# Patient Record
Sex: MALE | Race: WHITE | NOT HISPANIC OR LATINO | Employment: OTHER | URBAN - METROPOLITAN AREA
[De-identification: names, ages, dates, MRNs, and addresses within clinical notes are randomized per-mention and may not be internally consistent; named-entity substitution may affect disease eponyms.]

---

## 2023-08-14 ENCOUNTER — APPOINTMENT (INPATIENT)
Dept: CT IMAGING | Facility: HOSPITAL | Age: 68
DRG: 177 | End: 2023-08-14
Payer: COMMERCIAL

## 2023-08-14 ENCOUNTER — APPOINTMENT (EMERGENCY)
Dept: NON INVASIVE DIAGNOSTICS | Facility: HOSPITAL | Age: 68
DRG: 177 | End: 2023-08-14
Payer: COMMERCIAL

## 2023-08-14 ENCOUNTER — APPOINTMENT (EMERGENCY)
Dept: CT IMAGING | Facility: HOSPITAL | Age: 68
DRG: 177 | End: 2023-08-14
Payer: COMMERCIAL

## 2023-08-14 ENCOUNTER — HOSPITAL ENCOUNTER (INPATIENT)
Facility: HOSPITAL | Age: 68
LOS: 6 days | Discharge: HOME HEALTH | DRG: 177 | End: 2023-08-20
Attending: EMERGENCY MEDICINE | Admitting: INTERNAL MEDICINE
Payer: COMMERCIAL

## 2023-08-14 ENCOUNTER — APPOINTMENT (EMERGENCY)
Dept: RADIOLOGY | Facility: HOSPITAL | Age: 68
DRG: 177 | End: 2023-08-14
Payer: COMMERCIAL

## 2023-08-14 DIAGNOSIS — D70.9 NEUTROPENIA (HCC): ICD-10-CM

## 2023-08-14 DIAGNOSIS — C79.31 MALIGNANT NEOPLASM METASTATIC TO BRAIN (HCC): ICD-10-CM

## 2023-08-14 DIAGNOSIS — C15.9 ESOPHAGEAL CANCER (HCC): ICD-10-CM

## 2023-08-14 DIAGNOSIS — D64.9 ANEMIA: ICD-10-CM

## 2023-08-14 DIAGNOSIS — J18.9 PNEUMONIA DUE TO INFECTIOUS ORGANISM, UNSPECIFIED LATERALITY, UNSPECIFIED PART OF LUNG: ICD-10-CM

## 2023-08-14 DIAGNOSIS — J84.10 PULMONARY FIBROSIS (HCC): ICD-10-CM

## 2023-08-14 DIAGNOSIS — R06.00 DYSPNEA: Primary | ICD-10-CM

## 2023-08-14 PROBLEM — N40.0 BPH (BENIGN PROSTATIC HYPERPLASIA): Status: ACTIVE | Noted: 2022-11-25

## 2023-08-14 PROBLEM — N18.30 CKD (CHRONIC KIDNEY DISEASE) STAGE 3, GFR 30-59 ML/MIN (HCC): Status: ACTIVE | Noted: 2023-05-15

## 2023-08-14 PROBLEM — R06.09 DOE (DYSPNEA ON EXERTION): Status: ACTIVE | Noted: 2023-08-14

## 2023-08-14 PROBLEM — C80.1 CANCER (HCC): Status: ACTIVE | Noted: 2023-08-14

## 2023-08-14 PROBLEM — C80.1 CANCER (HCC): Status: RESOLVED | Noted: 2023-08-14 | Resolved: 2023-08-14

## 2023-08-14 PROBLEM — I82.409 DVT (DEEP VENOUS THROMBOSIS) (HCC): Status: ACTIVE | Noted: 2023-07-31

## 2023-08-14 LAB
2HR DELTA HS TROPONIN: -1 NG/L
4HR DELTA HS TROPONIN: 1 NG/L
ABO GROUP BLD: NORMAL
ABO GROUP BLD: NORMAL
ALBUMIN SERPL BCP-MCNC: 3.6 G/DL (ref 3.5–5)
ALP SERPL-CCNC: 109 U/L (ref 34–104)
ALT SERPL W P-5'-P-CCNC: 101 U/L (ref 7–52)
ANION GAP SERPL CALCULATED.3IONS-SCNC: 10 MMOL/L
ANISOCYTOSIS BLD QL SMEAR: PRESENT
AST SERPL W P-5'-P-CCNC: 42 U/L (ref 13–39)
ATRIAL RATE: 127 BPM
ATRIAL RATE: 94 BPM
ATRIAL RATE: 98 BPM
BASOPHILS # BLD MANUAL: 0 THOUSAND/UL (ref 0–0.1)
BASOPHILS NFR MAR MANUAL: 0 % (ref 0–1)
BILIRUB SERPL-MCNC: 1.35 MG/DL (ref 0.2–1)
BLD GP AB SCN SERPL QL: NEGATIVE
BNP SERPL-MCNC: 106 PG/ML (ref 0–100)
BUN SERPL-MCNC: 29 MG/DL (ref 5–25)
CALCIUM SERPL-MCNC: 8.7 MG/DL (ref 8.4–10.2)
CARDIAC TROPONIN I PNL SERPL HS: 13 NG/L
CARDIAC TROPONIN I PNL SERPL HS: 14 NG/L
CARDIAC TROPONIN I PNL SERPL HS: 15 NG/L
CHLORIDE SERPL-SCNC: 105 MMOL/L (ref 96–108)
CO2 SERPL-SCNC: 24 MMOL/L (ref 21–32)
CREAT SERPL-MCNC: 1.38 MG/DL (ref 0.6–1.3)
EOSINOPHIL # BLD MANUAL: 0 THOUSAND/UL (ref 0–0.4)
EOSINOPHIL NFR BLD MANUAL: 0 % (ref 0–6)
ERYTHROCYTE [DISTWIDTH] IN BLOOD BY AUTOMATED COUNT: 20.5 % (ref 11.6–15.1)
GFR SERPL CREATININE-BSD FRML MDRD: 51 ML/MIN/1.73SQ M
GLUCOSE SERPL-MCNC: 92 MG/DL (ref 65–140)
HCT VFR BLD AUTO: 22.6 % (ref 36.5–49.3)
HGB BLD-MCNC: 7.6 G/DL (ref 12–17)
LDH SERPL-CCNC: 533 U/L (ref 140–271)
LYMPHOCYTES # BLD AUTO: 0.33 THOUSAND/UL (ref 0.6–4.47)
LYMPHOCYTES # BLD AUTO: 23 % (ref 14–44)
MCH RBC QN AUTO: 37.1 PG (ref 26.8–34.3)
MCHC RBC AUTO-ENTMCNC: 33.6 G/DL (ref 31.4–37.4)
MCV RBC AUTO: 110 FL (ref 82–98)
MONOCYTES # BLD AUTO: 0.03 THOUSAND/UL (ref 0–1.22)
MONOCYTES NFR BLD: 2 % (ref 4–12)
NEUTROPHILS # BLD MANUAL: 1.08 THOUSAND/UL (ref 1.85–7.62)
NEUTS BAND NFR BLD MANUAL: 2 % (ref 0–8)
NEUTS SEG NFR BLD AUTO: 73 % (ref 43–75)
P AXIS: 72 DEGREES
P AXIS: 74 DEGREES
PLATELET # BLD AUTO: 115 THOUSANDS/UL (ref 149–390)
PLATELET BLD QL SMEAR: ABNORMAL
PMV BLD AUTO: 10 FL (ref 8.9–12.7)
POTASSIUM SERPL-SCNC: 4.1 MMOL/L (ref 3.5–5.3)
PR INTERVAL: 126 MS
PR INTERVAL: 128 MS
PROCALCITONIN SERPL-MCNC: 0.45 NG/ML
PROT SERPL-MCNC: 5.4 G/DL (ref 6.4–8.4)
QRS AXIS: 38 DEGREES
QRS AXIS: 42 DEGREES
QRS AXIS: 52 DEGREES
QRSD INTERVAL: 74 MS
QRSD INTERVAL: 76 MS
QRSD INTERVAL: 86 MS
QT INTERVAL: 320 MS
QT INTERVAL: 336 MS
QT INTERVAL: 340 MS
QTC INTERVAL: 420 MS
QTC INTERVAL: 425 MS
QTC INTERVAL: 434 MS
RBC # BLD AUTO: 2.05 MILLION/UL (ref 3.88–5.62)
RH BLD: NEGATIVE
RH BLD: NEGATIVE
SODIUM SERPL-SCNC: 139 MMOL/L (ref 135–147)
SPECIMEN EXPIRATION DATE: NORMAL
T WAVE AXIS: 63 DEGREES
T WAVE AXIS: 66 DEGREES
T WAVE AXIS: 88 DEGREES
VENTRICULAR RATE: 106 BPM
VENTRICULAR RATE: 94 BPM
VENTRICULAR RATE: 98 BPM
WBC # BLD AUTO: 1.44 THOUSAND/UL (ref 4.31–10.16)

## 2023-08-14 PROCEDURE — NC001 PR NO CHARGE: Performed by: EMERGENCY MEDICINE

## 2023-08-14 PROCEDURE — 86920 COMPATIBILITY TEST SPIN: CPT

## 2023-08-14 PROCEDURE — 83615 LACTATE (LD) (LDH) ENZYME: CPT | Performed by: INTERNAL MEDICINE

## 2023-08-14 PROCEDURE — G1004 CDSM NDSC: HCPCS

## 2023-08-14 PROCEDURE — 93010 ELECTROCARDIOGRAM REPORT: CPT | Performed by: STUDENT IN AN ORGANIZED HEALTH CARE EDUCATION/TRAINING PROGRAM

## 2023-08-14 PROCEDURE — 86900 BLOOD TYPING SEROLOGIC ABO: CPT

## 2023-08-14 PROCEDURE — 93010 ELECTROCARDIOGRAM REPORT: CPT | Performed by: INTERNAL MEDICINE

## 2023-08-14 PROCEDURE — 93005 ELECTROCARDIOGRAM TRACING: CPT

## 2023-08-14 PROCEDURE — 86901 BLOOD TYPING SEROLOGIC RH(D): CPT

## 2023-08-14 PROCEDURE — 84484 ASSAY OF TROPONIN QUANT: CPT

## 2023-08-14 PROCEDURE — 83880 ASSAY OF NATRIURETIC PEPTIDE: CPT

## 2023-08-14 PROCEDURE — 36415 COLL VENOUS BLD VENIPUNCTURE: CPT

## 2023-08-14 PROCEDURE — 87449 NOS EACH ORGANISM AG IA: CPT | Performed by: INTERNAL MEDICINE

## 2023-08-14 PROCEDURE — 85027 COMPLETE CBC AUTOMATED: CPT

## 2023-08-14 PROCEDURE — 99223 1ST HOSP IP/OBS HIGH 75: CPT | Performed by: INTERNAL MEDICINE

## 2023-08-14 PROCEDURE — 70491 CT SOFT TISSUE NECK W/DYE: CPT

## 2023-08-14 PROCEDURE — P9040 RBC LEUKOREDUCED IRRADIATED: HCPCS

## 2023-08-14 PROCEDURE — 86850 RBC ANTIBODY SCREEN: CPT

## 2023-08-14 PROCEDURE — 80053 COMPREHEN METABOLIC PANEL: CPT

## 2023-08-14 PROCEDURE — 99285 EMERGENCY DEPT VISIT HI MDM: CPT | Performed by: EMERGENCY MEDICINE

## 2023-08-14 PROCEDURE — 99285 EMERGENCY DEPT VISIT HI MDM: CPT

## 2023-08-14 PROCEDURE — 84145 PROCALCITONIN (PCT): CPT | Performed by: INTERNAL MEDICINE

## 2023-08-14 PROCEDURE — 30233N1 TRANSFUSION OF NONAUTOLOGOUS RED BLOOD CELLS INTO PERIPHERAL VEIN, PERCUTANEOUS APPROACH: ICD-10-PCS | Performed by: EMERGENCY MEDICINE

## 2023-08-14 PROCEDURE — 85007 BL SMEAR W/DIFF WBC COUNT: CPT

## 2023-08-14 PROCEDURE — 71250 CT THORAX DX C-: CPT

## 2023-08-14 PROCEDURE — 87040 BLOOD CULTURE FOR BACTERIA: CPT | Performed by: INTERNAL MEDICINE

## 2023-08-14 PROCEDURE — 93971 EXTREMITY STUDY: CPT

## 2023-08-14 PROCEDURE — 71045 X-RAY EXAM CHEST 1 VIEW: CPT

## 2023-08-14 RX ORDER — LANOLIN ALCOHOL/MO/W.PET/CERES
10 CREAM (GRAM) TOPICAL
Status: DISCONTINUED | OUTPATIENT
Start: 2023-08-14 | End: 2023-08-20 | Stop reason: HOSPADM

## 2023-08-14 RX ORDER — CEFTRIAXONE 1 G/50ML
1000 INJECTION, SOLUTION INTRAVENOUS EVERY 24 HOURS
Status: DISCONTINUED | OUTPATIENT
Start: 2023-08-14 | End: 2023-08-17

## 2023-08-14 RX ORDER — MELATONIN
1000 DAILY
Status: DISCONTINUED | OUTPATIENT
Start: 2023-08-15 | End: 2023-08-20 | Stop reason: HOSPADM

## 2023-08-14 RX ORDER — PANTOPRAZOLE SODIUM 40 MG/1
40 TABLET, DELAYED RELEASE ORAL DAILY
COMMUNITY

## 2023-08-14 RX ORDER — PREDNISONE 10 MG/1
5 TABLET ORAL DAILY
COMMUNITY
Start: 2023-08-10 | End: 2023-08-20

## 2023-08-14 RX ORDER — SENNOSIDES A AND B 8.6 MG/1
8.6 TABLET, FILM COATED ORAL 2 TIMES DAILY PRN
COMMUNITY

## 2023-08-14 RX ORDER — METOPROLOL SUCCINATE 25 MG/1
12.5 TABLET, EXTENDED RELEASE ORAL 2 TIMES DAILY
Status: DISCONTINUED | OUTPATIENT
Start: 2023-08-14 | End: 2023-08-20 | Stop reason: HOSPADM

## 2023-08-14 RX ORDER — SIMETHICONE 80 MG
80 TABLET,CHEWABLE ORAL 4 TIMES DAILY PRN
Status: DISCONTINUED | OUTPATIENT
Start: 2023-08-14 | End: 2023-08-20 | Stop reason: HOSPADM

## 2023-08-14 RX ORDER — ALBUTEROL SULFATE 90 UG/1
2 AEROSOL, METERED RESPIRATORY (INHALATION) EVERY 6 HOURS PRN
Status: DISCONTINUED | OUTPATIENT
Start: 2023-08-14 | End: 2023-08-20 | Stop reason: HOSPADM

## 2023-08-14 RX ORDER — ASPIRIN 325 MG
325 TABLET, DELAYED RELEASE (ENTERIC COATED) ORAL DAILY
COMMUNITY

## 2023-08-14 RX ORDER — METOPROLOL SUCCINATE 25 MG/1
12.5 TABLET, EXTENDED RELEASE ORAL 2 TIMES DAILY
COMMUNITY
Start: 2022-12-05 | End: 2023-12-05

## 2023-08-14 RX ORDER — ONDANSETRON 2 MG/ML
4 INJECTION INTRAMUSCULAR; INTRAVENOUS EVERY 6 HOURS PRN
Status: DISCONTINUED | OUTPATIENT
Start: 2023-08-14 | End: 2023-08-20 | Stop reason: HOSPADM

## 2023-08-14 RX ORDER — LANOLIN ALCOHOL/MO/W.PET/CERES
10 CREAM (GRAM) TOPICAL
COMMUNITY

## 2023-08-14 RX ORDER — MAGNESIUM HYDROXIDE/ALUMINUM HYDROXICE/SIMETHICONE 120; 1200; 1200 MG/30ML; MG/30ML; MG/30ML
30 SUSPENSION ORAL EVERY 6 HOURS PRN
Status: DISCONTINUED | OUTPATIENT
Start: 2023-08-14 | End: 2023-08-20 | Stop reason: HOSPADM

## 2023-08-14 RX ORDER — AZITHROMYCIN 250 MG/1
500 TABLET, FILM COATED ORAL EVERY 24 HOURS
Status: DISCONTINUED | OUTPATIENT
Start: 2023-08-14 | End: 2023-08-17

## 2023-08-14 RX ORDER — ACETAMINOPHEN 325 MG/1
650 TABLET ORAL EVERY 6 HOURS PRN
Status: DISCONTINUED | OUTPATIENT
Start: 2023-08-14 | End: 2023-08-20 | Stop reason: HOSPADM

## 2023-08-14 RX ORDER — PREDNISONE 5 MG/1
5 TABLET ORAL DAILY
Status: DISCONTINUED | OUTPATIENT
Start: 2023-08-15 | End: 2023-08-17

## 2023-08-14 RX ORDER — FINASTERIDE 5 MG/1
5 TABLET, FILM COATED ORAL DAILY
Status: DISCONTINUED | OUTPATIENT
Start: 2023-08-14 | End: 2023-08-20 | Stop reason: HOSPADM

## 2023-08-14 RX ORDER — ALBUTEROL SULFATE 90 UG/1
2 AEROSOL, METERED RESPIRATORY (INHALATION) EVERY 6 HOURS PRN
COMMUNITY
Start: 2023-08-10

## 2023-08-14 RX ORDER — CALCIUM CARBONATE 500 MG/1
1000 TABLET, CHEWABLE ORAL DAILY PRN
Status: DISCONTINUED | OUTPATIENT
Start: 2023-08-14 | End: 2023-08-20 | Stop reason: HOSPADM

## 2023-08-14 RX ORDER — PANTOPRAZOLE SODIUM 40 MG/1
40 TABLET, DELAYED RELEASE ORAL
Status: DISCONTINUED | OUTPATIENT
Start: 2023-08-15 | End: 2023-08-20 | Stop reason: HOSPADM

## 2023-08-14 RX ORDER — FINASTERIDE 5 MG/1
5 TABLET, FILM COATED ORAL DAILY
COMMUNITY

## 2023-08-14 RX ADMIN — FINASTERIDE 5 MG: 5 TABLET, FILM COATED ORAL at 18:28

## 2023-08-14 RX ADMIN — APIXABAN 5 MG: 5 TABLET, FILM COATED ORAL at 18:28

## 2023-08-14 RX ADMIN — AZITHROMYCIN 500 MG: 250 TABLET, FILM COATED ORAL at 18:28

## 2023-08-14 RX ADMIN — Medication 10.5 MG: at 21:41

## 2023-08-14 RX ADMIN — IOHEXOL 85 ML: 350 INJECTION, SOLUTION INTRAVENOUS at 08:42

## 2023-08-14 RX ADMIN — CEFTRIAXONE 1000 MG: 1 INJECTION, SOLUTION INTRAVENOUS at 18:28

## 2023-08-14 RX ADMIN — METOPROLOL SUCCINATE 12.5 MG: 25 TABLET, EXTENDED RELEASE ORAL at 18:28

## 2023-08-14 RX ADMIN — ONDANSETRON 4 MG: 2 INJECTION INTRAMUSCULAR; INTRAVENOUS at 19:36

## 2023-08-14 NOTE — ASSESSMENT & PLAN NOTE
Likely related to patient's chemotherapy, does have pancytopenia  Transfuse for hemoglobin less than 7  Hematology consult placed, will check [facility]

## 2023-08-14 NOTE — H&P
[facility]  H&P  Name: Jaz Gold 77 y.o. male I MRN: [id number]  Unit/Bed#: ED-29 I Date of Admission: 8/14/2023   Date of Service: 8/14/2023 I Hospital Day: 0  Assessment and Plan  * Pneumonia  Assessment & Plan  Possible gram-negative pneumonia given infiltrate seen on chest radiograph  Drip score of 4, patient is immunocompromised  Ceftriaxone and azithromycin, day #1  Obtain sputum culture, Legionella and strep pneumo antigen  Procalcitonin although may be unreliable in the setting of malignancy  Has had previous bronchoscopy in the past without any definitive organism    Cancer (HCC)-resolved as of 8/14/2023  Assessment & Plan          Anemia  Assessment & Plan  Likely related to patient's chemotherapy, does have pancytopenia  Transfuse for hemoglobin less than 7  Hematology consult placed, will check [facility]    DEMARCO (dyspnea on exertion)  Assessment & Plan  History of ongoing dyspnea on exertion, previously evaluated at [facility] with possible pneumonitis  He remains on prednisone, CT of the chest ordered for further characterization of right lower lobe infiltrate  Possibly related to patient's cancer  Low clinical suspicion for pulmonary embolism given recent negative PE study from 3 days ago, patient is fully anticoagulated with Eliquis    Neutropenia ([address])  Assessment & Plan  Continue neutropenic precautions, hematology consultation placed to discuss  Neupogen    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 Follows with [facility] oncology. Has had multiple admissions for dyspnea on exertion with negative PE and ACS work-ups.     DVT (deep venous thrombosis) (HCC)  Assessment & Plan  Remains on treatment with Eliquis    CKD (chronic kidney disease) stage 3, GFR 30-59 ml/min [facility])  Assessment & Plan  Lab Results   Component Value Date    EGFR 51 08/14/2023    CREATININE 1.38 (H) 08/14/2023   Renal function around historical baseline    BPH (benign prostatic hyperplasia)  Assessment & Plan  Resume medication for BPH and monitor urine output      Code Status: Level 3 - DNAR and DNI     VTE Prophylaxis: Apixaban (Eliquis)  / sequential compression device     POLST: There is no POLST form on file for this patient (pre-hospital)  Discussion with family: Patient updating family    Anticipated Length of Stay:  Patient will be admitted on an Inpatient basis with an anticipated length of stay of greater than 2 midnights. Justification for Hospital Stay: Pneumonia , ongoing dyspnea on exertion    Total Time for Visit, including Counseling / Coordination of Care: 1 hour. Greater than 50% of this total time spent on direct patient counseling and coordination of care. Chief Complaint:     Chief Complaint   Patient presents with   • Shortness of Breath     Pt states SOB for over a week, worse with activity. In the past week pt reports throat feeling swollen- last chemo treatment wed. Pt reports dull chest pain. Pt reports being diagnosed with DVT in right leg and put eliquis. Pt left arm is swollen. History of Present Illness:    Yaritza Castellon is a 77 y.o. male who presents with distant breath x1 week. The patient has had previous history of checkpoint inhibitor induced pneumonitis. He also receives chemotherapy. He does have a history of DVT in the right leg and is on currently on Eliquis. Recently had a PE study from 3 days ago which showed no pulmonary embolism. Review of the patient's records shows that he has had ongoing dyspnea on exertion since the end of July. Today he reports having some productive sputum, he is also noted to be neutropenic. Given his previous therapy induced pneumonitis he is on steroid treatment. .  Patient denies any recent travel or trip history    Review of Systems:    A complete and comprehensive 14 point organ system review was performed and all other systems are negative other than stated above in the HPI    Past Medical and Surgical History:     Past Medical History:   Diagnosis Date   • Cancer ([address])     esophageal   • DVT (deep venous thrombosis) ([address])        History reviewed. No pertinent surgical history. Meds/Allergies:    Prior to Admission medications    Medication Sig Start Date End Date Taking? Authorizing Provider   albuterol (PROVENTIL HFA,VENTOLIN HFA) 90 mcg/act inhaler Inhale 2 puffs every 6 (six) hours as needed 8/10/23  Yes Historical Provider, MD   apixaban (ELIQUIS) 5 mg Take 5 mg by mouth 2 (two) times a day 8/9/23 8/8/24 Yes Historical Provider, MD   aspirin (ECOTRIN) 325 mg EC tablet Take 325 mg by mouth in the morning   Yes Historical Provider, MD   Cholecalciferol 50 MCG (2000 UT) CAPS Take 1 capsule by mouth every morning   Yes Historical Provider, MD   finasteride (PROSCAR) 5 mg tablet Take 5 mg by mouth in the morning   Yes Historical Provider, MD   melatonin 3 mg Take 10 mg by mouth daily at bedtime   Yes Historical Provider, MD   metoprolol succinate (TOPROL-XL) 25 mg 24 hr tablet Take 12.5 mg by mouth 2 (two) times a day 12/5/22 12/5/23 Yes Historical Provider, MD   pantoprazole (PROTONIX) 40 mg tablet Take 40 mg by mouth in the morning   Yes Historical Provider, MD   predniSONE 10 mg tablet Take 5 mg by mouth daily 8/10/23  Yes Historical Provider, MD   senna (SENOKOT) 8.6 MG tablet Take 8.6 mg by mouth 2 (two) times a day as needed   Yes Historical Provider, MD   umeclidinium-vilanterol 62.5-25 mcg/actuation inhaler Inhale 1 puff daily 4/7/23  Yes Historical Provider, MD     I have reviewed home medications with patient personally. Allergies: Allergies   Allergen Reactions   • Levofloxacin Nausea Only   • Doxycycline GI Intolerance   • Penicillins GI Intolerance       Social History:     Marital Status:     Occupation: Retired    Substance Use History:   Social History     Substance and Sexual Activity   Alcohol Use Not Currently     Social History     Tobacco Use   Smoking Status Former   • Types: Cigarettes   Smokeless Tobacco Not on file     Social History     Substance and Sexual Activity   Drug Use Never       Family History:    History reviewed. No pertinent family history. Physical Exam:     Vitals:   Blood Pressure: 138/89 (08/14/23 1535)  Pulse: 96 (08/14/23 1535)  Temperature: 98.2 °F (36.8 °C) (08/14/23 1535)  Temp Source: Oral (08/14/23 1504)  Respirations: 18 (08/14/23 1535)  Height: 5' 9" (175.3 cm) (08/14/23 0717)  Weight - Scale: 86.6 kg (191 lb) (08/14/23 0717)  SpO2: 98 % (08/14/23 1535)      General: ill appearing, no acute distress  HEENT: atraumatic, PERRLA, moist mucosa, normal pharynx, normal tonsils and adenoids, normal tongue, no fluid in sinuses  Neck: Trachea midline, no carotid bruit, no masses  Respiratory: normal chest wall expansion, coarse breath sound, no r/r/w, no rubs  Cardiovascular: RRR, no m/r/g, Normal S1 and S2  Abdomen: Soft, non-tender, non-distended, normal bowel sounds in all quadrants, no hepatosplenomegaly, no tympany  Rectal: deferred  Musculoskeletal: normal ROM in upper and lower extremities  Integumentary: warm, dry, and pink, with no rash, purpura, or petechia  Heme/Lymph: no lymphadenopathy, no bruises  Neurological: Cranial Nerves II-XII grossly intact  Psychiatric: cooperative with normal mood, affect, and cognition    Additional Data:     Lab Results: I have personally reviewed pertinent reports.       Results from last 7 days   Lab Units 08/14/23  0748   WBC Thousand/uL 1.44*   HEMOGLOBIN g/dL 7.6*   HEMATOCRIT % 22.6*   PLATELETS Thousands/uL 115*   BANDS PCT % 2   LYMPHO PCT % 23   MONO PCT % 2*   EOS PCT % 0     Results from last 7 days   Lab Units 08/14/23  0748   SODIUM mmol/L 139   POTASSIUM mmol/L 4.1   CHLORIDE mmol/L 105   CO2 mmol/L 24   BUN mg/dL 29*   CREATININE mg/dL 1.38*   ANION GAP mmol/L 10 CALCIUM mg/dL 8.7   ALBUMIN g/dL 3.6   TOTAL BILIRUBIN mg/dL 1.35*   ALK PHOS U/L 109*   ALT U/L 101*   AST U/L 42*   GLUCOSE RANDOM mg/dL 92                     Results from last 7 days   Lab Units 08/14/23  1157 08/14/23  1012 08/14/23  0748   HS TNI 0HR ng/L  --   --  14   HS TNI 2HR ng/L  --  13  --    HS TNI 4HR ng/L 15  --   --        Imaging: I have personally reviewed pertinent reports. CT chest wo contrast   Final Result by Noreen Erwin MD (08/14 1702)      No definite acute disease. Small cluster of tree-in-bud nodules in the lateral right upper lobe, likely minimal bronchiolitis. Mild lower lobe basilar predominant reticulation and traction bronchiolectasis suggestive of mild fibrosis. No visible esophageal malignancy. Workstation performed: [id number]         CT soft tissue neck   Final Result by Bard Rebekah MD (08/14 0169)      No nodular enhancing lesions identified along the course of the aerodigestive tract. No discrete edema or collection identified. Correlate with outside prior exams for findings related to the patient's known clinical history of distal esophageal neoplasm. Workstation performed: [id number]         XR chest 1 view portable   ED Interpretation by Edgardo Boast, DO (08/14 4342)   Wet read - increased congestion RLL no effusion PTX consolidation, chemo port noted      Final Result by Marissa Martel MD (08/14 0908)   Faint groundglass opacity at right lung base suspicious for early infiltrate versus atelectasis         Workstation performed: [id number]         VAS upper limb venous duplex scan, unilateral/limited    (Results Pending)       EKG, Pathology, and Other Studies Reviewed on Admission:   · Chest radiograph reviewed, possible right lower lobe infiltrate    Allscripts / Epic Records Reviewed: Yes     ** Please Note: This note was completed in part utilizing [employer] Direct Software.   Grammatical errors, random word insertions, spelling mistakes, and incomplete sentences may be an occasional consequence of this system secondary to software limitations, ambient noise, and hardware issues. If you have any questions or concerns about the content, text, or information contained within the body of this dictation, please contact the provider for clarification. **

## 2023-08-14 NOTE — ASSESSMENT & PLAN NOTE
History of ongoing dyspnea on exertion, previously evaluated at [facility] with possible pneumonitis  He remains on prednisone, CT of the chest ordered for further characterization of right lower lobe infiltrate  Possibly related to patient's cancer  Low clinical suspicion for pulmonary embolism given recent negative PE study from 3 days ago, patient is fully anticoagulated with Eliquis

## 2023-08-14 NOTE — ED ATTENDING ATTESTATION
8[facility]14/2023  [facility]. I have discussed the patient with the resident/non-physician practitioner and agree with the resident's/non-physician practitioner's findings, Plan of Care, and MDM as documented in the resident's/non-physician practitioner's note, except where noted. All available labs and Radiology studies were reviewed. I was present for key portions of any procedure(s) performed by the resident/non-physician practitioner and I was immediately available to provide assistance. At this point I agree with the current assessment done in the Emergency Department. I have conducted an independent evaluation of this patient a history and physical is as follows:      A 75-year-old male with past medical history of CKD, DVT on Eliquis, TIA, BPH, metastatic esophageal cancer (on chemo), hyperlipidemia and hypertension; presents with shortness of breath that is gotten progressively worse for the past 2 weeks. Dyspnea significantly worsened last evening, when he felt his throat was closing. He did also notice a fullness to the anterior lower neck last evening. He has otherwise denied fever, chills, cough, chest pain, abdominal pain, nausea, vomiting, diarrhea, peripheral edema and rashes. He was seen at [address] [place] [address] ED on 8/11 for the shortness of breath, undergoing lab work and a CT PE study which were within normal limits. Physical Exam  General Appearance: alert and oriented, nad, non toxic appearing  Skin:  Warm, dry, intact  HEENT: atraumatic, normocephalic. Normal voice. No lip/tongue swelling. Neck: Supple, trachea midline. Soft tissue fullness noted to bilateral supraclavicular area. No induration, fluctuance or erythema.   Cardiac: RRR; no murmurs, rub, gallops  Pulmonary: lungs CTAB; no wheezes, rales, rhonchi  Gastrointestinal: abdomen soft, nontender, nondistended; no guarding or rebound tenderness; good bowel sounds, no mass or bruits  Extremities:  no pedal edema, 2+ pulses; no calf tenderness, no clubbing, no cyanosis  Neuro:  no focal motor or sensory deficits, CN 2-12 grossly intact  Psych:  Normal mood and affect, normal judgement and insight    Assessment and Plan:  Dyspnea, worsening over the past few weeks. Associated with fullness to the lower neck that began last evening. Reviewed recent ED work-up at [facility] which included a negative CT PE study. We will check lab work to evaluate for anemia, MARIN, electrolyte normality and cardiac etiology. Will obtain CT soft tissue neck to evaluate for lymphadenopathy & increased tumor burden. ED Course  ED Course as of 08/15/23 0912   Mon Aug 14, 2023   0849 Hemoglobin(!): 7.6  Most recent Hgb at [facility] 9, previous hemoglobin 10-12 over past several months         Critical Care Time  CriticalCare Time    Date/Time: 8/14/2023 12:14 PM    Performed by: Adi Dalton DO  Authorized by: Adi Dalton DO    Critical care provider statement:     Critical care time (minutes):  30    Critical care time was exclusive of:  Separately billable procedures and treating other patients and teaching time    Critical care was necessary to treat or prevent imminent or life-threatening deterioration of the following conditions: symptomatic anemia.     Critical care was time spent personally by me on the following activities:  Obtaining history from patient or surrogate, development of treatment plan with patient or surrogate, examination of patient, evaluation of patient's response to treatment, re-evaluation of patient's condition, ordering and review of radiographic studies, ordering and performing treatments and interventions, review of old charts and ordering and review of laboratory studies    I assumed direction of critical care for this patient from another provider in my specialty: no

## 2023-08-14 NOTE — ED PROVIDER NOTES
History  Chief Complaint   Patient presents with   • Shortness of Breath     Pt states SOB for over a week, worse with activity. In the past week pt reports throat feeling swollen- last chemo treatment wed. Pt reports dull chest pain. Pt reports being diagnosed with DVT in right leg and put eliquis. Pt left arm is swollen. Patient is a 49-year-old male past medical history esophageal cancer on chemotherapy that presents for evaluation of shortness of breath. Patient states that his symptoms have been ongoing but have worsened over the past week. States that he was seen during chemo session x3 days prior to arrival was noted to be short of breath at that time was seen and evaluated at [facility] ED and discharged home however his symptoms have persisted throughout the weekend. Dyspnea worse with exertion. Patient states that starting last night he felt that his throat was "closing up". Also complains of dull substernal nonradiating chest pain yesterday that has resolved. Also endorses right upper extremity swelling which she states is new today. Denies fever chills cough palpitations abdominal pain nausea vomiting diarrhea or any other complaints at this time. Of note patient has right lower extremity DVT, is on Eliquis. Prior to Admission Medications   Prescriptions Last Dose Informant Patient Reported? Taking?    Cholecalciferol 50 MCG (2000 UT) CAPS   Yes Yes   Sig: Take 1 capsule by mouth every morning   albuterol (PROVENTIL HFA,VENTOLIN HFA) 90 mcg/act inhaler   Yes Yes   Sig: Inhale 2 puffs every 6 (six) hours as needed   apixaban (ELIQUIS) 5 mg   Yes Yes   Sig: Take 5 mg by mouth 2 (two) times a day   aspirin (ECOTRIN) 325 mg EC tablet   Yes Yes   Sig: Take 325 mg by mouth in the morning   finasteride (PROSCAR) 5 mg tablet   Yes Yes   Sig: Take 5 mg by mouth in the morning   melatonin 3 mg   Yes Yes   Sig: Take 10 mg by mouth daily at bedtime   metoprolol succinate (TOPROL-XL) 25 mg 24 hr tablet   Yes Yes   Sig: Take 12.5 mg by mouth 2 (two) times a day   pantoprazole (PROTONIX) 40 mg tablet   Yes Yes   Sig: Take 40 mg by mouth in the morning   predniSONE 10 mg tablet   Yes Yes   Sig: Take 5 mg by mouth daily   senna (SENOKOT) 8.6 MG tablet   Yes Yes   Sig: Take 8.6 mg by mouth 2 (two) times a day as needed   umeclidinium-vilanterol 62.5-25 mcg/actuation inhaler   Yes Yes   Sig: Inhale 1 puff daily      Facility-Administered Medications: None       Past Medical History:   Diagnosis Date   • Cancer ([address])     esophageal   • DVT (deep venous thrombosis) ([address])        History reviewed. No pertinent surgical history. History reviewed. No pertinent family history. I have reviewed and agree with the history as documented. E-Cigarette/Vaping     E-Cigarette/Vaping Substances   • Nicotine No    • THC No    • CBD No    • Flavoring No    • Other No    • Unknown No      Social History     Tobacco Use   • Smoking status: Former     Types: Cigarettes   Substance Use Topics   • Alcohol use: Not Currently   • Drug use: Never        Review of Systems   Constitutional: Negative for chills and fever. HENT: Negative for congestion. Respiratory: Positive for shortness of breath. Negative for cough. Cardiovascular: Positive for chest pain. Negative for palpitations. Gastrointestinal: Negative for abdominal pain, nausea and vomiting. Musculoskeletal: Negative for back pain. All other systems reviewed and are negative.       Physical Exam  ED Triage Vitals [08/14/23 0717]   Temperature Pulse Respirations Blood Pressure SpO2   98.6 °F (37 °C) (!) 114 (!) 26 122/83 99 %      Temp Source Heart Rate Source Patient Position - Orthostatic VS BP Location FiO2 (%)   Oral Monitor Lying Right arm --      Pain Score       3             Orthostatic Vital Signs  Vitals:    08/14/23 1338 08/14/23 1445 08/14/23 1504 08/14/23 1535   BP: 122/78 128/93 131/81 138/89   Pulse: 96 100 99 96   Patient Position - Orthostatic VS:  Lying         Physical Exam  Vitals and nursing note reviewed. Constitutional:       General: He is not in acute distress. Appearance: He is well-developed. He is ill-appearing (Chronically). HENT:      Head: Normocephalic and atraumatic. Mouth/Throat:      Mouth: Mucous membranes are moist.   Eyes:      Extraocular Movements: Extraocular movements intact. Neck:      Comments: Some soft tissue swelling to the right anterior neck. No JVD no overlying skin changes  Cardiovascular:      Rate and Rhythm: Normal rate and regular rhythm. Pulmonary:      Effort: Pulmonary effort is normal. No respiratory distress. Breath sounds: Normal breath sounds. Abdominal:      Palpations: Abdomen is soft. Tenderness: There is no abdominal tenderness. Musculoskeletal:         General: Normal range of motion. Cervical back: Normal range of motion and neck supple. Comments: Right upper extremity edema   Skin:     General: Skin is warm and dry. Neurological:      General: No focal deficit present. Mental Status: He is alert and oriented to person, place, and time.          ED Medications  Medications   iohexol (OMNIPAQUE) 350 MG/ML injection (SINGLE-DOSE) 85 mL (85 mL Intravenous Given 8/14/23 0842)       Diagnostic Studies  Results Reviewed     Procedure Component Value Units Date/Time    HS Troponin I 4hr [[id number]]  (Normal) Collected: 08/14/23 1157    Lab Status: Final result Specimen: Blood from Arm, Left Updated: 08/14/23 1227     hs TnI 4hr 15 ng/L      Delta 4hr hsTnI 1 ng/L     HS Troponin I 2hr [[id number]]  (Normal) Collected: 08/14/23 1012    Lab Status: Final result Specimen: Blood from Arm, Left Updated: 08/14/23 1039     hs TnI 2hr 13 ng/L      Delta 2hr hsTnI -1 ng/L     Manual Differential(PHLEBS Do Not Order) [[id number]]  (Abnormal) Collected: 08/14/23 0748    Lab Status: Final result Specimen: Blood from Arm, Left Updated: 08/14/23 0921     Segmented % 73 % Bands % 2 %      Lymphocytes % 23 %      Monocytes % 2 %      Eosinophils, % 0 %      Basophils % 0 %      Absolute Neutrophils 1.08 Thousand/uL      Lymphocytes Absolute 0.33 Thousand/uL      Monocytes Absolute 0.03 Thousand/uL      Eosinophils Absolute 0.00 Thousand/uL      Basophils Absolute 0.00 Thousand/uL      Total Counted --     Platelet Estimate Borderline     Anisocytosis Present    CBC and differential [[id number]]  (Abnormal) Collected: 08/14/23 0748    Lab Status: Final result Specimen: Blood from Arm, Left Updated: 08/14/23 0843     WBC 1.44 Thousand/uL      RBC 2.05 Million/uL      Hemoglobin 7.6 g/dL      Hematocrit 22.6 %       fL      MCH 37.1 pg      MCHC 33.6 g/dL      RDW 20.5 %      MPV 10.0 fL      Platelets 541 Thousands/uL     B-Type Natriuretic Peptide(BNP) [[id number]]  (Abnormal) Collected: 08/14/23 0748    Lab Status: Final result Specimen: Blood from Arm, Left Updated: 08/14/23 0821      pg/mL     HS Troponin 0hr (reflex protocol) [[id number]]  (Normal) Collected: 08/14/23 0748    Lab Status: Final result Specimen: Blood from Arm, Left Updated: 08/14/23 0820     hs TnI 0hr 14 ng/L     Comprehensive metabolic panel [[id number]]  (Abnormal) Collected: 08/14/23 0748    Lab Status: Final result Specimen: Blood from Arm, Left Updated: 08/14/23 2221     Sodium 139 mmol/L      Potassium 4.1 mmol/L      Chloride 105 mmol/L      CO2 24 mmol/L      ANION GAP 10 mmol/L      BUN 29 mg/dL      Creatinine 1.38 mg/dL      Glucose 92 mg/dL      Calcium 8.7 mg/dL      AST 42 U/L       U/L      Alkaline Phosphatase 109 U/L      Total Protein 5.4 g/dL      Albumin 3.6 g/dL      Total Bilirubin 1.35 mg/dL      eGFR 51 ml/min/1.73sq m     Narrative:      [place] guidelines for Chronic Kidney Disease (CKD):   •  Stage 1 with normal or high GFR (GFR > 90 mL/min/1.73 square meters)  •  Stage 2 Mild CKD (GFR = 60-89 mL/min/1.73 square meters)  •  Stage 3A Moderate CKD (GFR = 45-59 mL/min/1.73 square meters)  •  Stage 3B Moderate CKD (GFR = 30-44 mL/min/1.73 square meters)  •  Stage 4 Severe CKD (GFR = 15-29 mL/min/1.73 square meters)  •  Stage 5 End Stage CKD (GFR <15 mL/min/1.73 square meters)  Note: GFR calculation is accurate only with a steady state creatinine                 CT soft tissue neck   Final Result by Radames Doe MD (08/14 6955)      No nodular enhancing lesions identified along the course of the aerodigestive tract. No discrete edema or collection identified. Correlate with outside prior exams for findings related to the patient's known clinical history of distal esophageal neoplasm.       Workstation performed: [id number]         XR chest 1 view portable   ED Interpretation by Romina Gabriel DO (08/14 7377)   Wet read - increased congestion RLL no effusion PTX consolidation, chemo port noted      Final Result by Lion Betancourt MD (08/14 3357)   Faint groundglass opacity at right lung base suspicious for early infiltrate versus atelectasis         Workstation performed: [id number]         VAS upper limb venous duplex scan, unilateral/limited    (Results Pending)   CTA chest pe study    (Results Pending)         Procedures  ECG 12 Lead Documentation Only    Date/Time: 8/14/2023 10:20 AM    Performed by: Romina Gabriel DO  Authorized by: Romina Gabriel DO    Indications / Diagnosis:  SOB  ECG reviewed by me, the ED Provider: yes    Patient location:  ED  Previous ECG:     Previous ECG:  Unavailable  Interpretation:     Interpretation: abnormal    Rate:     ECG rate:  106    ECG rate assessment: tachycardic    Rhythm:     Rhythm: sinus tachycardia    Ectopy:     Ectopy: PVCs    QRS:     QRS axis:  Normal    QRS intervals:  Normal  Conduction:     Conduction: normal    ST segments:     ST segments:  Normal  T waves:     T waves: normal    ECG 12 Lead Documentation Only    Date/Time: 8/14/2023 10:21 AM    Performed by: Romina Gabriel DO  Authorized by: Ishaan Holloway DO    Indications / Diagnosis:  Repeat trop  ECG reviewed by me, the ED Provider: yes    Patient location:  ED  Previous ECG:     Previous ECG:  Compared to current    Similarity:  Changes noted  Interpretation:     Interpretation: abnormal    Rate:     ECG rate:  96    ECG rate assessment: normal    Rhythm:     Rhythm: sinus rhythm    Ectopy:     Ectopy: none    QRS:     QRS axis:  Normal    QRS intervals:  Normal  Conduction:     Conduction: normal    ST segments:     ST segments:  Normal  T waves:     T waves: normal    ECG 12 Lead Documentation Only    Date/Time: 8/14/2023 12:13 PM    Performed by: Ishaan Holloway DO  Authorized by: Ishaan Holloawy DO    Indications / Diagnosis:  Repeat   ECG reviewed by me, the ED Provider: yes    Patient location:  ED  Previous ECG:     Previous ECG:  Compared to current    Similarity:  Changes noted  Interpretation:     Interpretation: abnormal    Rate:     ECG rate:  98    ECG rate assessment: normal    Rhythm:     Rhythm: sinus rhythm    Ectopy:     Ectopy: PVCs    QRS:     QRS axis:  Normal    QRS intervals:  Normal  Conduction:     Conduction: abnormal      Abnormal conduction: non-specific intraventricular conduction delay    ST segments:     ST segments:  Normal  T waves:     T waves: normal            ED Course  ED Course as of 08/14/23 1546   Mon Aug 14, 2023   0819 BUN(!): 29   0819 Creatinine(!): 1.38  Appears to be at baseline per external chart review    0845 Platelet Count(!): 455   0845 Hemoglobin(!): 7.6  Down from 9 - x1 month ago hgb was 10-12 - bleeding? 0845 WBC(!!): 1.44  Pt likely leukopenic 2/2 chemotherapy, these values are c/w labs from [facility]   0952 (-) guaiac stool test. Exam performed in presence of chaperone Erman Habermann, RN   0350 (-) for DVT and SVT   1022 (-) CT   1044 Delta 2hr hsTnI: -1                             SBIRT 20yo+    Flowsheet Row Most Recent Value   Initial Alcohol Screen: US AUDIT-C     1.  How often do you have a drink containing alcohol? 0 Filed at: 08/14/2023 0746   2. How many drinks containing alcohol do you have on a typical day you are drinking? 0 Filed at: 08/14/2023 0746   3a. Male UNDER 65: How often do you have five or more drinks on one occasion? 0 Filed at: 08/14/2023 0746   3b. FEMALE Any Age, or MALE 65+: How often do you have 4 or more drinks on one occassion? 0 Filed at: 08/14/2023 0746   Audit-C Score 0 Filed at: 08/14/2023 6790   KIARA: How many times in the past year have you. .. Used an illegal drug or used a prescription medication for non-medical reasons? Never Filed at: 08/14/2023 0746                Medical Decision Making  Patient is a 49-year-old male presenting for evaluation of shortness of breath    Differential: Increased tumor burden from esophageal cancer versus ACS versus COPD versus CHF versus electrolyte abnormality versus anemia. Patient had PE study x3 days ago is anticoagulated unlikely to be PE. Will assess for DVT of right upper extremity    Plan: CBC CMP troponin BNP EKG chest x-ray CT soft tissue neck Doppler right upper extremity monitor and reassess    CT does not appear to show any new tumor or mass effect in the neck. Airway appears patent patient has not had any stridor trismus change in phonation difficulty handling secretions. Has been satting well on room air    Labs notable for increase of 750 from 500 several weeks ago. Troponin 14. Kidney function at baseline. Patient neutropenic WBC 1.4 likely secondary to chemo. Hemoglobin 7.6 down from 9 a month ago the month prior to that it was 12. No signs of bleeding negative guaiac test (which was performed in presence of chaperone). We will transfuse 1 unit PRBC. This is also likely due to to chemo    Upon reassessment patient does not feel any improvement to his dyspnea after transfusion. Will admit for dyspnea. Patient hemodynamically stable at time of admission.     Amount and/or Complexity of Data Reviewed  Labs: ordered. Decision-making details documented in ED Course. Radiology: ordered and independent interpretation performed. Risk  Prescription drug management. Decision regarding hospitalization. Disposition  Final diagnoses:   Dyspnea   Neutropenia ([address])   Esophageal cancer ([address])   Anemia     Time reflects when diagnosis was documented in both MDM as applicable and the Disposition within this note     Time User Action Codes Description Comment    8/14/2023  3:46 PM Makenzie De Jesus Add [R06.00] Dyspnea     8/14/2023  3:46 PM Makenzie De Jesus Add [D70.9] Neutropenia ([address])     8/14/2023  3:46 PM Makenzie De Jesus Add [C15.9] Esophageal cancer ([address])     8/14/2023  3:46 PM Makenzie De Jesus Add [D64.9] Anemia       ED Disposition     ED Disposition   Admit    Condition   Stable    Date/Time   Mon Aug 14, 2023  3:46 PM    Comment              Follow-up Information    None         Patient's Medications   Discharge Prescriptions    No medications on file     No discharge procedures on file. PDMP Review     None           ED Provider  Attending physically available and evaluated Lilly Pettit. I managed the patient along with the ED Attending.     Electronically Signed by         Makenzie De Jesus DO  08/16/23 2007

## 2023-08-14 NOTE — ASSESSMENT & PLAN NOTE
Lab Results   Component Value Date    EGFR 51 08/14/2023    CREATININE 1.38 (H) 08/14/2023   Renal function around historical baseline

## 2023-08-14 NOTE — ASSESSMENT & PLAN NOTE
History  metastatic adenocarcinoma of the esophagus with recent admission for nivolumab pneumonitis at [facility] at 7/31/2023 . Follows with [facility] oncology. Has had multiple admissions for dyspnea on exertion with negative PE and ACS work-ups.

## 2023-08-14 NOTE — ASSESSMENT & PLAN NOTE
Possible gram-negative pneumonia given infiltrate seen on chest radiograph  Drip score of 4, patient is immunocompromised  Ceftriaxone and azithromycin, day #1  Obtain sputum culture, Legionella and strep pneumo antigen  Procalcitonin although may be unreliable in the setting of malignancy  Has had previous bronchoscopy in the past without any definitive organism

## 2023-08-15 LAB
ABO GROUP BLD BPU: NORMAL
ANION GAP SERPL CALCULATED.3IONS-SCNC: 10 MMOL/L
ANISOCYTOSIS BLD QL SMEAR: PRESENT
BASOPHILS # BLD MANUAL: 0 THOUSAND/UL (ref 0–0.1)
BASOPHILS NFR MAR MANUAL: 0 % (ref 0–1)
BPU ID: NORMAL
BUN SERPL-MCNC: 28 MG/DL (ref 5–25)
CALCIUM SERPL-MCNC: 8.1 MG/DL (ref 8.4–10.2)
CHLORIDE SERPL-SCNC: 106 MMOL/L (ref 96–108)
CO2 SERPL-SCNC: 23 MMOL/L (ref 21–32)
CREAT SERPL-MCNC: 1.28 MG/DL (ref 0.6–1.3)
CROSSMATCH: NORMAL
EOSINOPHIL # BLD MANUAL: 0 THOUSAND/UL (ref 0–0.4)
EOSINOPHIL NFR BLD MANUAL: 0 % (ref 0–6)
ERYTHROCYTE [DISTWIDTH] IN BLOOD BY AUTOMATED COUNT: 23.6 % (ref 11.6–15.1)
GFR SERPL CREATININE-BSD FRML MDRD: 56 ML/MIN/1.73SQ M
GLUCOSE SERPL-MCNC: 77 MG/DL (ref 65–140)
HCT VFR BLD AUTO: 23.2 % (ref 36.5–49.3)
HGB BLD-MCNC: 7.9 G/DL (ref 12–17)
L PNEUMO1 AG UR QL IA.RAPID: NEGATIVE
LYMPHOCYTES # BLD AUTO: 0.49 THOUSAND/UL (ref 0.6–4.47)
LYMPHOCYTES # BLD AUTO: 20 % (ref 14–44)
MACROCYTES BLD QL AUTO: PRESENT
MCH RBC QN AUTO: 35.3 PG (ref 26.8–34.3)
MCHC RBC AUTO-ENTMCNC: 34.1 G/DL (ref 31.4–37.4)
MCV RBC AUTO: 104 FL (ref 82–98)
METAMYELOCYTES NFR BLD MANUAL: 2 % (ref 0–1)
MONOCYTES # BLD AUTO: 0.15 THOUSAND/UL (ref 0–1.22)
MONOCYTES NFR BLD: 6 % (ref 4–12)
MYELOCYTES NFR BLD MANUAL: 2 % (ref 0–1)
NEUTROPHILS # BLD MANUAL: 1.72 THOUSAND/UL (ref 1.85–7.62)
NEUTS BAND NFR BLD MANUAL: 1 % (ref 0–8)
NEUTS SEG NFR BLD AUTO: 69 % (ref 43–75)
PLATELET # BLD AUTO: 101 THOUSANDS/UL (ref 149–390)
PLATELET BLD QL SMEAR: ABNORMAL
PMV BLD AUTO: 10 FL (ref 8.9–12.7)
POTASSIUM SERPL-SCNC: 4 MMOL/L (ref 3.5–5.3)
PROCALCITONIN SERPL-MCNC: 0.5 NG/ML
RBC # BLD AUTO: 2.24 MILLION/UL (ref 3.88–5.62)
S PNEUM AG UR QL: NEGATIVE
SODIUM SERPL-SCNC: 139 MMOL/L (ref 135–147)
UNIT DISPENSE STATUS: NORMAL
UNIT PRODUCT CODE: NORMAL
UNIT PRODUCT VOLUME: 250 ML
UNIT RH: NORMAL
WBC # BLD AUTO: 2.45 THOUSAND/UL (ref 4.31–10.16)

## 2023-08-15 PROCEDURE — 93971 EXTREMITY STUDY: CPT | Performed by: SURGERY

## 2023-08-15 PROCEDURE — 87077 CULTURE AEROBIC IDENTIFY: CPT | Performed by: INTERNAL MEDICINE

## 2023-08-15 PROCEDURE — 80048 BASIC METABOLIC PNL TOTAL CA: CPT | Performed by: INTERNAL MEDICINE

## 2023-08-15 PROCEDURE — 99232 SBSQ HOSP IP/OBS MODERATE 35: CPT | Performed by: PHYSICIAN ASSISTANT

## 2023-08-15 PROCEDURE — 84145 PROCALCITONIN (PCT): CPT | Performed by: INTERNAL MEDICINE

## 2023-08-15 PROCEDURE — 85007 BL SMEAR W/DIFF WBC COUNT: CPT | Performed by: INTERNAL MEDICINE

## 2023-08-15 PROCEDURE — 87070 CULTURE OTHR SPECIMN AEROBIC: CPT | Performed by: INTERNAL MEDICINE

## 2023-08-15 PROCEDURE — 85027 COMPLETE CBC AUTOMATED: CPT | Performed by: INTERNAL MEDICINE

## 2023-08-15 PROCEDURE — 87186 SC STD MICRODIL/AGAR DIL: CPT | Performed by: INTERNAL MEDICINE

## 2023-08-15 PROCEDURE — 97167 OT EVAL HIGH COMPLEX 60 MIN: CPT

## 2023-08-15 PROCEDURE — 99223 1ST HOSP IP/OBS HIGH 75: CPT | Performed by: NURSE PRACTITIONER

## 2023-08-15 PROCEDURE — 87205 SMEAR GRAM STAIN: CPT | Performed by: INTERNAL MEDICINE

## 2023-08-15 RX ORDER — POLYETHYLENE GLYCOL 3350 17 G/17G
17 POWDER, FOR SOLUTION ORAL DAILY PRN
Status: DISCONTINUED | OUTPATIENT
Start: 2023-08-15 | End: 2023-08-15

## 2023-08-15 RX ORDER — POLYETHYLENE GLYCOL 3350 17 G/17G
17 POWDER, FOR SOLUTION ORAL DAILY
Status: DISCONTINUED | OUTPATIENT
Start: 2023-08-15 | End: 2023-08-20 | Stop reason: HOSPADM

## 2023-08-15 RX ORDER — SODIUM CHLORIDE 9 MG/ML
75 INJECTION, SOLUTION INTRAVENOUS CONTINUOUS
Status: DISCONTINUED | OUTPATIENT
Start: 2023-08-15 | End: 2023-08-16

## 2023-08-15 RX ORDER — SENNOSIDES 8.6 MG
1 TABLET ORAL
Status: DISCONTINUED | OUTPATIENT
Start: 2023-08-16 | End: 2023-08-20 | Stop reason: HOSPADM

## 2023-08-15 RX ORDER — SENNOSIDES 8.6 MG
1 TABLET ORAL ONCE
Status: COMPLETED | OUTPATIENT
Start: 2023-08-15 | End: 2023-08-15

## 2023-08-15 RX ADMIN — PANTOPRAZOLE SODIUM 40 MG: 40 TABLET, DELAYED RELEASE ORAL at 06:51

## 2023-08-15 RX ADMIN — SODIUM CHLORIDE 75 ML/HR: 0.9 INJECTION, SOLUTION INTRAVENOUS at 17:11

## 2023-08-15 RX ADMIN — APIXABAN 5 MG: 5 TABLET, FILM COATED ORAL at 08:35

## 2023-08-15 RX ADMIN — CEFTRIAXONE 1000 MG: 1 INJECTION, SOLUTION INTRAVENOUS at 17:15

## 2023-08-15 RX ADMIN — UMECLIDINIUM BROMIDE AND VILANTEROL TRIFENATATE 1 PUFF: 62.5; 25 POWDER RESPIRATORY (INHALATION) at 08:35

## 2023-08-15 RX ADMIN — POLYETHYLENE GLYCOL 3350 17 G: 17 POWDER, FOR SOLUTION ORAL at 15:45

## 2023-08-15 RX ADMIN — APIXABAN 5 MG: 5 TABLET, FILM COATED ORAL at 17:11

## 2023-08-15 RX ADMIN — FINASTERIDE 5 MG: 5 TABLET, FILM COATED ORAL at 08:35

## 2023-08-15 RX ADMIN — METOPROLOL SUCCINATE 12.5 MG: 25 TABLET, EXTENDED RELEASE ORAL at 08:35

## 2023-08-15 RX ADMIN — SENNOSIDES 8.6 MG: 8.6 TABLET, FILM COATED ORAL at 12:28

## 2023-08-15 RX ADMIN — Medication 1000 UNITS: at 08:35

## 2023-08-15 RX ADMIN — Medication 10.5 MG: at 23:57

## 2023-08-15 RX ADMIN — AZITHROMYCIN 500 MG: 250 TABLET, FILM COATED ORAL at 17:15

## 2023-08-15 RX ADMIN — METOPROLOL SUCCINATE 12.5 MG: 25 TABLET, EXTENDED RELEASE ORAL at 17:11

## 2023-08-15 RX ADMIN — PREDNISONE 5 MG: 5 TABLET ORAL at 08:35

## 2023-08-15 NOTE — ASSESSMENT & PLAN NOTE
History  metastatic adenocarcinoma of the esophagus with recent admission for nivolumab pneumonitis at [facility] at 7/31/2023 . Follows with [facility] oncology. Has had multiple admissions for dyspnea on exertion with negative PE and ACS work-ups. Continue pain management and add MiraLAX and Senokot for constipation. Patient is not interested in discussing hospice at this time and would like to continue current therapy.

## 2023-08-15 NOTE — CONSULTS
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 He is currently being followed by Dr. Dana Real at [facility] oncology. His last treatment was on 8/9/23 (leucovorin, oxaliplatin, fluorouracil [5-FU]). with pump disconnect on Friday, 8/11/23. After pump disconnect, patient was emergently taken to [facility] ED for shortness of breath, but was discharged shortly thereafter. Patient uncertain if he received a growth factor s/p treatment. He presented to the ED on 8/14/23 with dyspnea. He reported having ongoing dyspnea on exertion and was evaluated at [facility] for possible pneumonitis. He did have a history of pneumonitis that was immunotherapy-induced (nivolumab) on 7/31/2023. Imaging showed infiltrate seen on chest x-ray. He was started on ceftriaxone and azithromycin. Sputum culture pending. On examination, patient was resting comfortably with complaints of chest tightness whenever he takes deep breaths, possibly pleuritic chest pain rating it at 6 out of a 10. He also complains of constipation, but has been eating well. He reported consuming all of his breakfast this morning. Patient expressed that he is considering ceasing all chemotherapy treatments in the future as he " is exhausted from multiple hospitalizations."  Discussed general guidelines about comfort directed measures/hospice. He was encouraged to discuss further with his primary oncologist at [facility]. He lives at home with his transgender daughter who has been extremely supportive of his cancer journey. Assessment and Plan:  1. Malignant neoplasm of lower third of esophagus      Malignant neoplasm metastatic to brain     Pancytopenia  2. Neutropenia  -Evident since 7/2023  -WBC 2.45  ANC 1.72. Afebrile.   -s/p chemotherapy on 8/8/23 (oxalipaltin and 5-FU). 5-FU disconnect on 8/11/23. 3. Acute anemia  -Evident since 7/2023. ,  MCHC 34.1   -macrocytic, normochromic  -Hemoglobin trend: 7.9 < 7.6    4. Thrombocytopenia  Platelet trend: 121 < 115  -No signs of bleeding. Presence of diffuse bruising on upper extremities    CBC (8/15/23)  Hemoglobin 7.9  WBC 2.45  ANC 1.72. Afebrile. Platelet 696    PLAN:  -Patient is afebrile with ANC of 1.72. He is currently on antibiotics with no respiratory symptoms with the exception of possible pleuritic chest pain. No recommendations for G-CSF at this time. We will reach out to patient's oncology team at [facility] for additional recommendations.    -Patient expressed his intentions of not continuing with his chemotherapy treatment; briefly discussed comfort-directed measures and possible scenarios if hospice is chosen as an alternative pathway. Encourage patient to discuss with oncologist further. He has declined further conversations about hospice at this time; would like to think about it during this inpatient stay. -Recommend Miralax and Senokot for his constipation, two agents that he has been taking at home    -Recommend as needed pain medication    Outpatient follow up plan: will follow up with Dr. Mrak Zhu at [address] [place] [address]    Thank you for this consult. Jaylon Bansal DNP, [facility]  Hematology-Oncology       History of present illness:  Marc Huang is a 77 y.o. 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 He is currently being followed by Dr. Devendra Morris at [facility] oncology. His last treatment was on 8/9/23 (leucovorin, oxaliplatin, fluorouracil [5-FU]). with pump disconnect on Friday, 8/11/23. After pump disconnect, patient was emergently taken to [facility] ED for shortness of breath, but was discharged shortly thereafter. Patient uncertain if he received a growth factor s/p treatment. He presented to the ED on 8/14/23 with dyspnea.   He reported having ongoing dyspnea on exertion and was evaluated at [facility] for possible pneumonitis. He did have a history of pneumonitis that was immunotherapy-induced (nivolumab) on 7/31/2023. Imaging showed infiltrate seen on chest x-ray. He was started on ceftriaxone and azithromycin. Sputum culture pending. On examination, patient was resting comfortably with complaints of chest tightness whenever he takes deep breaths, possibly pleuritic chest pain rating it at 6 out of a 10. He also complains of constipation, but has been eating well. He reported consuming all of his breakfast this morning. Patient expressed that he is considering ceasing all chemotherapy treatments in the future as he " is exhausted from multiple hospitalizations."  Discussed general guidelines about comfort directed measures/hospice. He was encouraged to discuss further with his primary oncologist at [facility]. Review of Systems:   Review of Systems   Constitutional: Positive for activity change and fatigue. Negative for chills and fever. HENT: Negative for ear pain and sore throat. Eyes: Negative for pain and visual disturbance. Respiratory: Positive for chest tightness. Negative for cough and shortness of breath. Cardiovascular: Negative for chest pain and palpitations. Gastrointestinal: Negative for abdominal pain and vomiting. Genitourinary: Negative for dysuria and hematuria. Musculoskeletal: Negative for arthralgias and back pain. Skin: Negative for color change and rash. Neurological: Negative for seizures and syncope. Psychiatric/Behavioral: The patient is nervous/anxious. All other systems reviewed and are negative. Oncology History:   Cancer Staging   No matching staging information was found for the patient. Oncology History    No history exists. Past Medical History:   Past Medical History:   Diagnosis Date   • Cancer ([address])     esophageal   • DVT (deep venous thrombosis) ([address])        History reviewed.  No pertinent surgical history. History reviewed. No pertinent family history. Social History     Socioeconomic History   • Marital status:       Spouse name: None   • Number of children: None   • Years of education: None   • Highest education level: None   Occupational History   • None   Tobacco Use   • Smoking status: Former     Types: Cigarettes   • Smokeless tobacco: None   Vaping Use   • Vaping Use: None   Substance and Sexual Activity   • Alcohol use: Not Currently   • Drug use: Never   • Sexual activity: Not Currently   Other Topics Concern   • None   Social History Narrative   • None     Social Determinants of Health     Financial Resource Strain: Not on file   Food Insecurity: Not on file   Transportation Needs: Not on file   Physical Activity: Not on file   Stress: Not on file   Social Connections: Not on file   Intimate Partner Violence: Not on file   Housing Stability: Not on file         Current Facility-Administered Medications:   •  acetaminophen (TYLENOL) tablet 650 mg, 650 mg, Oral, Q6H PRN, Williams Gammons, DO  •  albuterol (PROVENTIL HFA,VENTOLIN HFA) inhaler 2 puff, 2 puff, Inhalation, Q6H PRN, Williams Gammons, DO  •  aluminum-magnesium hydroxide-simethicone (MAALOX) oral suspension 30 mL, 30 mL, Oral, Q6H PRN, Williams Gammons, DO  •  apixaban (ELIQUIS) tablet 5 mg, 5 mg, Oral, BID, Colton Burnard May, DO, 5 mg at 08/15/23 7556  •  azithromycin (ZITHROMAX) tablet 500 mg, 500 mg, Oral, Q24H, Colton Burnard May, DO, 500 mg at 08/14/23 1828  •  calcium carbonate (TUMS) chewable tablet 1,000 mg, 1,000 mg, Oral, Daily PRN, Williams Gammons, DO  •  cefTRIAXone (ROCEPHIN) IVPB (premix in dextrose) 1,000 mg 50 mL, 1,000 mg, Intravenous, Q24H, Colton Burnard May, DO, Last Rate: 100 mL/hr at 08/14/23 1828, 1,000 mg at 08/14/23 1828  •  cholecalciferol (VITAMIN D3) tablet 1,000 Units, 1,000 Units, Oral, Daily, Tobe Gammons, DO, 1,000 Units at 08/15/23 0835  •  finasteride (PROSCAR) tablet 5 mg, 5 mg, Oral, Daily, Coltonruth ann Saule Evan, DO, 5 mg at 08/15/23 7021  •  melatonin tablet 10.5 mg, 10.5 mg, Oral, HS, Colton Denyce Evan, DO, 10.5 mg at 08/14/23 2141  •  metoprolol succinate (TOPROL-XL) 24 hr tablet 12.5 mg, 12.5 mg, Oral, BID, Sandra Lopez, DO, 12.5 mg at 08/15/23 5978  •  ondansetron [facility]) injection 4 mg, 4 mg, Intravenous, Q6H PRN, Sandra John, DO, 4 mg at 08/14/23 1936  •  pantoprazole (PROTONIX) EC tablet 40 mg, 40 mg, Oral, Daily Before Breakfast, Colton Greenberg Evan, DO, 40 mg at 08/15/23 4617  •  predniSONE tablet 5 mg, 5 mg, Oral, Daily, Colton Gordon, DO, 5 mg at 08/15/23 6138  •  simethicone (MYLICON) chewable tablet 80 mg, 80 mg, Oral, 4x Daily PRN, Sandra John, DO  •  umeclidinium-vilanterol 62.5-25 mcg/actuation inhaler 1 puff, 1 puff, Inhalation, Daily, Colton Gordon, DO, 1 puff at 08/15/23 0835    Medications Prior to Admission   Medication   • albuterol (PROVENTIL HFA,VENTOLIN HFA) 90 mcg/act inhaler   • apixaban (ELIQUIS) 5 mg   • aspirin (ECOTRIN) 325 mg EC tablet   • Cholecalciferol 50 MCG (2000 UT) CAPS   • finasteride (PROSCAR) 5 mg tablet   • melatonin 3 mg   • metoprolol succinate (TOPROL-XL) 25 mg 24 hr tablet   • pantoprazole (PROTONIX) 40 mg tablet   • predniSONE 10 mg tablet   • senna (SENOKOT) 8.6 MG tablet   • umeclidinium-vilanterol 62.5-25 mcg/actuation inhaler       Allergies   Allergen Reactions   • Levofloxacin Nausea Only   • Doxycycline GI Intolerance   • Penicillins GI Intolerance         Physical Exam:     /93 (BP Location: Right arm)   Pulse 101   Temp (!) 97.3 °F (36.3 °C) (Temporal)   Resp 20   Ht 5' 9" (1.753 m)   Wt 85.2 kg (187 lb 12.8 oz)   SpO2 99%   BMI 27.73 kg/m²     Physical Exam  HENT:      Head: Normocephalic. Mouth/Throat:      Mouth: Mucous membranes are moist.   Eyes:      Conjunctiva/sclera: Conjunctivae normal.   Cardiovascular:      Rate and Rhythm: Normal rate and regular rhythm.    Pulmonary:      Effort: Pulmonary effort is normal.      Comments: Chest pain upon taking deep breaths  Abdominal:      Palpations: Abdomen is soft. Skin:     General: Skin is warm and dry. Findings: Bruising present. Neurological:      General: No focal deficit present. Mental Status: He is alert and oriented to person, place, and time. Motor: Weakness present. Psychiatric:         Behavior: Behavior normal.         Thought Content:  Thought content normal.      Comments: tearful           Recent Results (from the past 48 hour(s))   ECG 12 lead    Collection Time: 08/14/23  7:19 AM   Result Value Ref Range    Ventricular Rate 106 BPM    Atrial Rate 127 BPM    GA Interval  ms    QRSD Interval 74 ms    QT Interval 320 ms    QTC Interval 425 ms    P Axis  degrees    QRS Axis 52 degrees    T Wave Axis 66 degrees   Comprehensive metabolic panel    Collection Time: 08/14/23  7:48 AM   Result Value Ref Range    Sodium 139 135 - 147 mmol/L    Potassium 4.1 3.5 - 5.3 mmol/L    Chloride 105 96 - 108 mmol/L    CO2 24 21 - 32 mmol/L    ANION GAP 10 mmol/L    BUN 29 (H) 5 - 25 mg/dL    Creatinine 1.38 (H) 0.60 - 1.30 mg/dL    Glucose 92 65 - 140 mg/dL    Calcium 8.7 8.4 - 10.2 mg/dL    AST 42 (H) 13 - 39 U/L     (H) 7 - 52 U/L    Alkaline Phosphatase 109 (H) 34 - 104 U/L    Total Protein 5.4 (L) 6.4 - 8.4 g/dL    Albumin 3.6 3.5 - 5.0 g/dL    Total Bilirubin 1.35 (H) 0.20 - 1.00 mg/dL    eGFR 51 ml/min/1.73sq m   HS Troponin 0hr (reflex protocol)    Collection Time: 08/14/23  7:48 AM   Result Value Ref Range    hs TnI 0hr 14 "Refer to ACS Flowchart"- see link ng/L   B-Type Natriuretic Peptide(BNP)    Collection Time: 08/14/23  7:48 AM   Result Value Ref Range     (H) 0 - 100 pg/mL   CBC and differential    Collection Time: 08/14/23  7:48 AM   Result Value Ref Range    WBC 1.44 (LL) 4.31 - 10.16 Thousand/uL    RBC 2.05 (L) 3.88 - 5.62 Million/uL    Hemoglobin 7.6 (L) 12.0 - 17.0 g/dL    Hematocrit 22.6 (L) 36.5 - 49.3 %     (H) 82 - 98 fL    MCH 37.1 (H) 26.8 - 34.3 pg    MCHC 33.6 31.4 - 37.4 g/dL    RDW 20.5 (H) 11.6 - 15.1 %    MPV 10.0 8.9 - 12.7 fL    Platelets 287 (L) 503 - 390 Thousands/uL   Manual Differential(PHLEBS Do Not Order)    Collection Time: 08/14/23  7:48 AM   Result Value Ref Range    Segmented % 73 43 - 75 %    Bands % 2 0 - 8 %    Lymphocytes % 23 14 - 44 %    Monocytes % 2 (L) 4 - 12 %    Eosinophils, % 0 0 - 6 %    Basophils % 0 0 - 1 %    Absolute Neutrophils 1.08 (L) 1.85 - 7.62 Thousand/uL    Lymphocytes Absolute 0.33 (L) 0.60 - 4.47 Thousand/uL    Monocytes Absolute 0.03 0.00 - 1.22 Thousand/uL    Eosinophils Absolute 0.00 0.00 - 0.40 Thousand/uL    Basophils Absolute 0.00 0.00 - 0.10 Thousand/uL    Total Counted      Platelet Estimate Borderline (A) Adequate    Anisocytosis Present    Blood culture    Collection Time: 08/14/23  7:48 AM    Specimen: Line, Venous; Blood   Result Value Ref Range    Blood Culture Received in Microbiology Lab. Culture in Progress.     LD,Blood    Collection Time: 08/14/23  7:48 AM   Result Value Ref Range     (H) 140 - 271 U/L   Procalcitonin    Collection Time: 08/14/23  7:48 AM   Result Value Ref Range    Procalcitonin 0.45 (H) <=0.25 ng/ml   HS Troponin I 2hr    Collection Time: 08/14/23 10:12 AM   Result Value Ref Range    hs TnI 2hr 13 "Refer to ACS Flowchart"- see link ng/L    Delta 2hr hsTnI -1 <20 ng/L   ECG 12 lead    Collection Time: 08/14/23 10:14 AM   Result Value Ref Range    Ventricular Rate 94 BPM    Atrial Rate 94 BPM    VT Interval 128 ms    QRSD Interval 86 ms    QT Interval 336 ms    QTC Interval 420 ms    P Garibaldi 74 degrees    QRS Axis 38 degrees    T Wave Axis 63 degrees   Type and screen    Collection Time: 08/14/23 10:45 AM   Result Value Ref Range    ABO Grouping O     Rh Factor Negative     Antibody Screen Negative     Specimen Expiration Date [id number]    [facility] Recheck - Contact Blood Bank Prior to Collection    Collection Time: 08/14/23 11:03 AM   Result Value Ref Range    ABO Grouping O     Rh Factor Negative    HS Troponin I 4hr    Collection Time: 08/14/23 11:57 AM   Result Value Ref Range    hs TnI 4hr 15 "Refer to ACS Flowchart"- see link ng/L    Delta 4hr hsTnI 1 <20 ng/L   ECG 12 lead    Collection Time: 08/14/23 11:59 AM   Result Value Ref Range    Ventricular Rate 98 BPM    Atrial Rate 98 BPM    KY Interval 126 ms    QRSD Interval 76 ms    QT Interval 340 ms    QTC Interval 434 ms    P Strum 72 degrees    QRS Axis 42 degrees    T Wave Axis 88 degrees   Blood culture    Collection Time: 08/14/23  5:06 PM    Specimen: Arm, Right; Blood   Result Value Ref Range    Blood Culture Received in Microbiology Lab. Culture in Progress.     Strep Pneumoniae, Urine    Collection Time: 08/14/23  7:55 PM    Specimen: Urine, Clean Catch   Result Value Ref Range    Strep pneumoniae antigen, urine Negative Negative   Legionella antigen, Urine    Collection Time: 08/14/23  7:55 PM    Specimen: Urine, Clean Catch   Result Value Ref Range    Legionella Urinary Antigen Negative Negative   Procalcitonin, Next Day AM Collection    Collection Time: 08/15/23  5:00 AM   Result Value Ref Range    Procalcitonin 0.50 (H) <=0.25 ng/ml   Basic metabolic panel, AM Draw, Tomorrow    Collection Time: 08/15/23  5:00 AM   Result Value Ref Range    Sodium 139 135 - 147 mmol/L    Potassium 4.0 3.5 - 5.3 mmol/L    Chloride 106 96 - 108 mmol/L    CO2 23 21 - 32 mmol/L    ANION GAP 10 mmol/L    BUN 28 (H) 5 - 25 mg/dL    Creatinine 1.28 0.60 - 1.30 mg/dL    Glucose 77 65 - 140 mg/dL    Calcium 8.1 (L) 8.4 - 10.2 mg/dL    eGFR 56 ml/min/1.73sq m   Prepare Leukoreduced RBC: 1 Units, Irradiated, Leukoreduced    Collection Time: 08/15/23  5:30 AM   Result Value Ref Range    Unit Product Code T9603R31     Unit Number [id number]     Unit ABO O     Unit RH NEG     Crossmatch Compatible     Unit Dispense Status Presumed Trans     Unit Product Volume 250 mL   CBC and differential, AM Draw, Tomorrow    Collection Time: 08/15/23  6:51 AM   Result Value Ref Range    WBC 2.45 (L) 4.31 - 10.16 Thousand/uL    RBC 2.24 (L) 3.88 - 5.62 Million/uL    Hemoglobin 7.9 (L) 12.0 - 17.0 g/dL    Hematocrit 23.2 (L) 36.5 - 49.3 %     (H) 82 - 98 fL    MCH 35.3 (H) 26.8 - 34.3 pg    MCHC 34.1 31.4 - 37.4 g/dL    RDW 23.6 (H) 11.6 - 15.1 %    MPV 10.0 8.9 - 12.7 fL    Platelets 420 (L) 285 - 390 Thousands/uL   Manual Differential(PHLEBS Do Not Order)    Collection Time: 08/15/23  6:51 AM   Result Value Ref Range    Segmented % 69 43 - 75 %    Bands % 1 0 - 8 %    Lymphocytes % 20 14 - 44 %    Monocytes % 6 4 - 12 %    Eosinophils, % 0 0 - 6 %    Basophils % 0 0 - 1 %    Metamyelocytes% 2 (H) 0 - 1 %    Myelocytes % 2 (H) 0 - 1 %    Absolute Neutrophils 1.72 (L) 1.85 - 7.62 Thousand/uL    Lymphocytes Absolute 0.49 (L) 0.60 - 4.47 Thousand/uL    Monocytes Absolute 0.15 0.00 - 1.22 Thousand/uL    Eosinophils Absolute 0.00 0.00 - 0.40 Thousand/uL    Basophils Absolute 0.00 0.00 - 0.10 Thousand/uL    Total Counted      Platelet Estimate Borderline (A) Adequate    Anisocytosis Present     Macrocytes Present        VAS upper limb venous duplex scan, unilateral/limited    Result Date: 8/15/2023  Narrative:  [facility] REPORT CLINICAL: Indications: Patient presents with right upper extremity edema. Operative History: Denies Any Cardiovascular Surgeries Risk Factors The patient has history of Malignant Neoplasm of the lower 1/3rd Esophagus with Metastatic to Brain,  Hyperlipidemia and previous smoking (quit 5-10yrs ago). CONCLUSION:  Impression RIGHT UPPER LIMB: No evidence of acute or chronic deep vein thrombosis. No evidence of superficial thrombophlebitis noted. Doppler evaluation shows a normal response to augmentation maneuvers. LEFT UPPER LIMB LIMITED: Evaluation shows no evidence of thrombus in the internal jugular vein, subclavian vein, and the innominate vein.   SIGNATURE: Electronically Signed by: Stephanie Deng MD, [facility] on 2023-08-15 10:45:25 AM    CT chest wo contrast    Result Date: 8/14/2023  Narrative: CT CHEST WITHOUT IV CONTRAST INDICATION:   cancer patient, shortness of breath; r/p pneumonia? cancer patient, shortness of breath. Per my review of the medical record, esophageal cancer with brain metastases. Chemotherapy last Wednesday. Shortness of breath for 2 weeks. Evaluated at [facility] on 8/11/2023 with CT negative for pulmonary embolus. Feels his throat is closing. COMPARISON: Chest radiograph and neck CT from today. TECHNIQUE: Chest CT without intravenous contrast.  Axial, sagittal, coronal 2D reformats and coronal MIPS from source data. Radiation dose length product (DLP):  382 mGy-cm . Radiation dose exposure minimized using iterative reconstruction and automated exposure control. FINDINGS: LUNGS: Emphysema. No definite acute disease. Mild basilar predominant juxtapleural reticulation with traction bronchiolectasis suggestive of mild fibrosis. Mild groundglass opacity in both lungs with a linear appearance on coronal series, likely atelectasis and mild fibrosis. Small cluster of tree-in-bud nodules in the lateral right upper lobe. AIRWAYS: No significant filling defects. PLEURA:  Unremarkable. HEART/GREAT VESSELS: Normal heart size. Mild coronary artery calcification indicating atherosclerotic heart disease. Right port at cavoatrial junction. MEDIASTINUM AND NELL: No visible esophageal malignancy. CHEST WALL AND LOWER NECK: Unremarkable. UPPER ABDOMEN: Diffuse hepatic steatosis. OSSEOUS STRUCTURES: Mild degenerative disease in the spine. Impression: No definite acute disease. Small cluster of tree-in-bud nodules in the lateral right upper lobe, likely minimal bronchiolitis. Mild lower lobe basilar predominant reticulation and traction bronchiolectasis suggestive of mild fibrosis. No visible esophageal malignancy.  Workstation performed: [id number]     CT soft tissue neck    Result Date: 8/14/2023  Narrative: CT NECK WITH CONTRAST INDICATION:   SOB - hx esophageal CA - r/o increased tumor burden/airway. Metastatic disease to the brain. Chemotherapy last Wednesday. History of malignant neoplasm involving the lower third of the esophagus. COMPARISON: Correlation made to outside CT report from dating back to 2/6/2023 TECHNIQUE:  Axial, sagittal, and coronal 2D reformatted images were created from the axial source data and submitted for interpretation. Radiation dose length product (DLP) for this visit:  928 mGy-cm . This examination, like all CT scans performed in the [facility], was performed utilizing techniques to minimize radiation dose exposure, including the use of iterative reconstruction and automated exposure control. IV Contrast:  85 mL of iohexol (OMNIPAQUE) IMAGE QUALITY:  Diagnostic. FINDINGS: VISUALIZED BRAIN PARENCHYMA: Correlate with outside MRI for findings related to known brain metastases. VISUALIZED ORBITS: Normal visualized orbits. PARANASAL SINUSES: Normal visualized paranasal sinuses. NASAL CAVITY AND NASOPHARYNX:  Normal. SUPRAHYOID NECK:  Normal oral cavity, tongue base, tonsillar fossa and epiglottis. INFRAHYOID NECK:  Aryepiglottic folds and piriform sinuses are normal.  Normal glottis and subglottic airway. THYROID GLAND:  Unremarkable. PAROTID AND SUBMANDIBULAR GLANDS:  Normal. LYMPH NODES:  No pathologic or enlarged adenopathy. VASCULAR STRUCTURES:  Normal enhancement of the cervical vasculature. THORACIC INLET: There is emphysema. Patient has a reported known history of lower esophageal cancer, not imaged on the current examination. The visualized esophagus is under distended. Right-sided Mediport partially imaged. BONY STRUCTURES: There is cervical spondylosis. No discrete lytic or blastic osseous lesions are identified. Impression: No nodular enhancing lesions identified along the course of the aerodigestive tract.  No discrete edema or collection identified. Correlate with outside prior exams for findings related to the patient's known clinical history of distal esophageal neoplasm. Workstation performed: [id number]     XR chest 1 view portable    Result Date: 8/14/2023  Narrative: CHEST INDICATION:   SOB. COMPARISON:  None EXAM PERFORMED/VIEWS:  XR CHEST PORTABLE Single view FINDINGS: Right IJ Mediport terminating in the SVC just above the cavoatrial junction Cardiomediastinal silhouette appears unremarkable. Faint groundglass opacity at right lung base suspicious for early infiltrate versus atelectasis No pneumothorax or pleural effusion. Osseous structures appear within normal limits for patient age. Impression: Faint groundglass opacity at right lung base suspicious for early infiltrate versus atelectasis Workstation performed: [id number]     CT CHEST PULMONARY EMBOLISM W CONTRAST    Result Date: 8/11/2023  Narrative: History: Shortness of breath.   Exam: CT of the chest with IV contrast (PE protocol)   Technique: Axial CT of the chest performed with 90 cc of Omnipaque 350 intravenous contrast with particular attention to the pulmonary arteries. Coronal reformations were performed as well.   Comparison: CT chest dated 7/29/2023   Findings: Pulmonary Arteries: There is adequate opacification of the pulmonary arteries. No filling defect within the pulmonary arteries to indicate pulmonary embolism. Lungs, pleura: Central airways are patent. Emphysematous changes most prominent within the bilateral lung apices. Dependent atelectasis. There is no focal consolidation. No pneumothorax is seen. No pleural effusion is noted. Linear atelectasis versus scarring within the lung bases. Cardiovascular, mediastinum, thyroid: The heart size is normal. No pericardial effusion is seen. There is atherosclerotic calcification of the coronary arteries and thoracic aorta. There is mild atherosclerotic calcification of the coronary arteries.  The visualized thyroid gland appears grossly normal. Suggestion of wall thickening within the mid to distal esophagus compatible with esophageal reflux/GERD.   Lymph nodes: No thoracic lymphadenopathy is seen. Alex, chest wall: The visualized osseous structures appear grossly normal. No suspicious osseous lesions are seen. Upper abdomen: Unremarkable, including the adrenal glands.       Impression: Impression:   No evidence of pulmonary embolism or other acute abnormality in the chest. Other findings as above.   Workstation:[id number]    XR chest pa & lateral    Result Date: 8/11/2023  Narrative: History: Shortness of breath . Technique: PA and lateral views of the chest. Comparisons: Comparison is made to prior radiographs performed on 7/31/2023. Findings: Distal tip of right IJ Port-A-Cath at the level of the superior vena cava. The cardiac silhouette is not enlarged. The mediastinum is not widened. No pneumothorax is visualized. No pneumomediastinum is visualized. No pleural effusion is visualized. No evidence of pneumonia. No acute fracture or dislocation is visualized. Impression: Impression: No acute pathology visualized. Workstation:[id number]    MRI BRAIN WWO CONTRAST    Result Date: 8/4/2023  Narrative: MRI brain with and without contrast CLINICAL INFORMATION: Brain metastases. TECHNIQUE: Multiplanar, multisequence MRI brain was performed before and after contrast administration. 17.5 mL of DOTAREM was administered. COMPARISON: 05/02/2023 and prior examinations. FINDINGS: Significant motion artifacts present. Brain parenchyma: Right frontal lesion measuring 3 mm (series 9 image 73) and left cerebellar lesion measuring 3 mm (series 9 image 16) again identified. Enhancing lesion measuring 4 mm (series 9 image 20) in the left cerebellum, new. The other lesions are not clearly identified. Nonspecific T2/FLAIR hyperintensities again identified in the cerebral white matter and brainstem. No acute infarct.  Ventricles: Normal. Extra-axial spaces: Normal. Intracranial hemorrhage: No acute intracranial hemorrhage. Midline shift: None. Calvarium and skull base: Normal. Orbits: Normal. Paranasal sinuses: Clear. Mastoids: Small amount of fluid in the mastoid air cells. Sella: Normal.    Impression: IMPRESSION: 1.  Significant motion artifacts present. 2.  Enhancing lesion in the left cerebellum, new from the prior examination. 3.  Right frontal and left cerebellar lesions again identified. The other lesions are not clearly identified. [id number]    ECHO 2D COMPLETE    Result Date: 8/2/2023  Narrative: This result has an attachment that is not available. Technically difficult study. Left ventricle is normal in size. Mild basal septal hypertrophy. Systolic function is normal with an ejection fraction of 60-65%. Wall motion is within normal limits. There is grade I (mild) diastolic dysfunction and   normal left atrial pressure Right ventricle was not well visualized. Right ventricle cavity is top normal. Systolic function is normal. Mild mitral regurgitation. Left Ventricle Left ventricle is normal in size. Mild basal septal hypertrophy. Systolic function is normal with an ejection fraction of 60-65%. Wall motion is within normal limits. There is grade I (mild) diastolic dysfunction and normal left atrial pressure. Right Ventricle Right ventricle was not well visualized. Right ventricle cavity is top normal. Systolic function is normal. Left Atrium Left atrium cavity size is normal. Right Atrium Right atrium cavity is normal. IVC/SVC The inferior vena cava demonstrates a diameter of <=21 mm and collapses >50%; therefore, the right atrial pressure is estimated at 0-5 mmHg. Mitral Valve The leaflets are mildly thickened. There is mild regurgitation. There is no evidence of mitral valve stenosis. Tricuspid Valve Tricuspid valve structure is normal. There is trace regurgitation. There is no evidence of tricuspid valve stenosis.  Aortic Valve The aortic valve is not well-visualized to determine number of cusps. There is no regurgitation or stenosis. Pulmonic Valve Pulmonic valve structure is normal. There is no regurgitation or stenosis. Ascending Aorta The aorta appears top normal in size. Pericardium There is no pericardial effusion. The pericardium has a fat pad. Study Details A complete 2D echocardiogram was performed. Color flow, Pulse Wave and Continuous Wave Doppler was performed and analyzed. VAS VENOUS DUPLEX LOWER EXTREMITY BILATERAL COMPLETE    Result Date: 8/1/2023  Narrative: History: Shortness of breath. Evaluate for DVT. Real-time B-mode ultrasound, spectral Doppler analysis and color Doppler examination of both lower extremities were performed. Both common femoral veins, femoral veins, popliteal veins and visualized left calf veins are normal in caliber and compressibility. These veins demonstrate phasic venous waveforms, with normal blood flow on color Doppler examination. Within the right calf, the posterior tibial veins are patent and compressible. The peroneal veins are dilated and noncompressible and contains segments of occlusive and nonocclusive thrombus. An intramuscular soleal vein is dilated and noncompressible, and contains segments of occlusive and nonocclusive thrombus. Impression: Impression: 1. There is acute right calf DVT within the peroneal veins and an intramuscular soleal vein. These veins contain segments of occlusive and nonocclusive thrombus. There is no extension of thrombus into the right popliteal vein. 2. No evidence of DVT within the left femoral-popliteal system. Workstation:[id number]    CT renal stone study abdomen pelvis wo contrast    Result Date: 7/31/2023  Narrative: History: Diarrhea and sepsis Exam: Nonenhanced CT of the abdomen and pelvis. 7/31/2023   Technique: Using helical technique, axial images were obtained through the abdomen and pelvis.  Coronal and sagittal reformations were performed.   Comparison: CT chest, abdomen and pelvis 5/5/2023     Abdomen: Lung Bases: Minor atelectasis or scarring is evident at the lung bases Liver: Normal Gallbladder/Bile ducts: Normal. Spleen: Spleen not enlarged. Pancreas: The pancreas is partially fatty replaced. No pancreatic mass, edema or peripancreatic collection can be detected. Adrenal glands: Normal. Kidneys/Ureters: The right kidney contains a 1 cm high attenuation lesion in the lower pole which is most likely a hemorrhagic cyst. No retained calculi or hydronephrosis are evident involving either kidney. Bowel/Mesentery: No free intraperitoneal air is evident. A small hiatal hernia is noted. No bowel obstruction is evident. The appendix is visualized and appears normal. Lymph nodes: Normal. Vessels: No abdominal aortic aneurysm or retroperitoneal hematoma is evident. Abdominal Wall: Normal. Pelvis: No pelvic mass or free fluid is evident. Urinary Bladder: Normal. Lymph nodes:  Normal.   Bones: No destructive bony lesions are evident.      Impression: Impression: No bowel obstruction or free air. Workstation:[id number]    XR chest pa & lateral    Result Date: 7/31/2023  Narrative: AP and lateral views of chest compared with examination on 7/29/2023 HISTORY: There is a right Mediport catheter with tip in the region of superior vena cava. The heart is of normal size. There is no widening of mediastinum. There appears mild infiltrate and/or atelectasis in bilateral lung bases. There are no gross pleural effusions. There is no pneumothorax. Impression: IMPRESSION: Mild infiltrate and/or atelectasis in lung bases, slightly increased. Workstation:[id number]    CT CHEST PULMONARY EMBOLISM W CONTRAST    Result Date: 7/29/2023  Narrative: History: Shortness of breath.   Exam: CT of the chest with IV contrast (PE protocol)   Technique: Axial CT of the chest performed with 75 cc of Omnipaque 350 intravenous contrast with particular attention to the pulmonary arteries. Coronal reformations were performed as well.   Comparison: CT chest 5/14/2023   Findings: Pulmonary Arteries: Normal.   Lungs/Pleura: Patchy peripheral and basilar predominant consolidative and groundglass opacity seen previously has mostly resolved, with probable mild residual scarring. Mild upper lung emphysema. No pneumothorax or pleural effusion Mediastinum/Lymph nodes/Heart: No thoracic adenopathy or pericardial effusion. No acute aortic pathology. Port catheter tip in the SVC Chest Wall: Normal.   Upper abdomen/Other: Hepatic steatosis Bones: No acute osseous abnormality.      Impression: Impression:  No evidence of pulmonary embolism or other acute abnormality in the chest.   Workstation:[id number]    XR chest pa & lateral    Result Date: 7/29/2023  Narrative: Study: Two-view chest. COMPARISON: May 14, 2023. HISTORY: Shortness of breath. Moderate size infiltrate right base greater than left essentially resolved. Mild residual atelectasis within the right lower lobe and left infrahilar compartment. The mid to upper lung fields are clear. No pleural effusions. Hilar mediastinal compartments are without adenopathy. Impression: IMPRESSION: Resolution to the moderate size infiltrate right base greater than left on earlier study. Mild degree of residual left infrahilar and right lower lobe atelectasis. Workstation:[id number]      Labs and pertinent reports reviewed. This note has been generated by voice recognition software system. Therefore, there may be spelling, grammar, and or syntax errors. Please contact if questions arise.

## 2023-08-15 NOTE — PLAN OF CARE
Problem: OCCUPATIONAL THERAPY ADULT  Goal: Performs self-care activities at highest level of function for planned discharge setting. See evaluation for individualized goals. Description: Treatment Interventions: ADL retraining, Functional transfer training, UE strengthening/ROM, Endurance training, Patient/family training, Equipment evaluation/education, Neuromuscular reeducation, Compensatory technique education, Continued evaluation, Energy conservation, Activityengagement, Cardiac education  Equipment Recommended: Bedside commode, Other (comment), Shower/Tub chair with back ($) (RW)       See flowsheet documentation for full assessment, interventions and recommendations. Note: Limitation: Decreased ADL status, Decreased UE strength, Decreased endurance, Decreased self-care trans, Decreased high-level ADLs  Prognosis: Fair  Assessment: Patient is a 77y.o. year old male seen for OT eval s/p admit to [facility] on [facility]/14/2023 with pneumonia, neutropenia, DEMARCO, anemia. Patient with active OT orders and activity orders for Up and OOB as tolerated . Personal factors affecting pt at time of IE include: difficulty performing ADLs and IADLs, difficulty with functional mobility/transfers. Upon evaluation, patient’s functional status as follows: eating: Chinyere, grooming: supervision , UB bathing: Ag, LB bathing: maxA, UB dressing: Ag, LB dressing: maxA, toileting: Nathan Yadi; functional transfers: supervision , bed mobility: supervision , functional mobility: supervision , sitting/standing tolerance: <30 seconds with b/l UE support - due to the following deficits impacting occupational performance: weakness, decreased strength , decreased balance, decreased activity tolerance, limited functional reach and decreased cardiovascular endurance.  These impairments, as well at pt’s [place] home environment, difficulty performing ADLs, difficulty performing IADLs, difficulty performing transfers/mobility, fall risk , functional decline , new O2 requirements, increased reliance on DME  and multiple admissions  limit pt’s ability to safely engage in all baseline areas of occupation. Patient would benefit from continued skilled OT therapy while in acute setting to address deficits as defined above and maximize (I) with ADLs and functional mobility. Occupational performance areas to address include: grooming, bathing/shower, toilet hygiene, dressing, medication management, health maintenance, functional mobility, clothing management, cleaning and meal prep. Based on the aforementioned OT evaluation, functional performance deficits, and assessments, pt has been identified as a high complexity evaluation. From OT standpoint, recommend home with HH vs STR pending progress upon D/C.  OT will continue to follow pt 2-5x/wk to address the following goals to  w/in 10-14 days     OT Discharge Recommendation: Post acute rehabilitation services ([facility]/PT pending progress)

## 2023-08-15 NOTE — PLAN OF CARE
Problem: Potential for Falls  Goal: Patient will remain free of falls  Description: INTERVENTIONS:  - Educate patient/family on patient safety including physical limitations  - Instruct patient to call for assistance with activity   - Consult OT/PT to assist with strengthening/mobility   - Keep Call bell within reach  - Keep bed low and locked with side rails adjusted as appropriate  - Keep care items and personal belongings within reach  - Initiate and maintain comfort rounds  - Make Fall Risk Sign visible to staff  - Offer Toileting every  Hours, in advance of need  - Initiate/Maintain alarm  - Obtain necessary fall risk management equipment: - Apply yellow socks and bracelet for high fall risk patients  - Consider moving patient to room near nurses station  Outcome: Progressing     Problem: MOBILITY - ADULT  Goal: Maintain or return to baseline ADL function  Description: INTERVENTIONS:  -  Assess patient's ability to carry out ADLs; assess patient's baseline for ADL function and identify physical deficits which impact ability to perform ADLs (bathing, care of mouth/teeth, toileting, grooming, dressing, etc.)  - Assess/evaluate cause of self-care deficits   - Assess range of motion  - Assess patient's mobility; develop plan if impaired  - Assess patient's need for assistive devices and provide as appropriate  - Encourage maximum independence but intervene and supervise when necessary  - Involve family in performance of ADLs  - Assess for home care needs following discharge   - Consider OT consult to assist with ADL evaluation and planning for discharge  - Provide patient education as appropriate  Outcome: Progressing  Goal: Maintains/Returns to pre admission functional level  Description: INTERVENTIONS:  - Perform BMAT or MOVE assessment daily.   - Set and communicate daily mobility goal to care team and patient/family/caregiver.    - Collaborate with rehabilitation services on mobility goals if consulted  - Perform Range of Motion  times a day. - Reposition patient every  hours.   - Dangle patient  times a day  - Stand patient  times a day  - Ambulate patient  times a day  - Out of bed to chair times a day   - Out of bed for meals times a day  - Out of bed for toileting  - Record patient progress and toleration of activity level   Outcome: Progressing

## 2023-08-15 NOTE — PROGRESS NOTES
[facility]  Progress Note  Name: Allan Rosales  MRN: [id number]  Unit/Bed#: E4 -01 I Date of Admission: 8/14/2023   Date of Service: 8/15/2023 I Hospital Day: 1    Assessment/Plan   * Pneumonia  Assessment & Plan  Possible gram-negative pneumonia given infiltrate seen on chest radiograph  Drip score of 4, patient is immunocompromised  Ceftriaxone and azithromycin, day #2  Obtain sputum culture, Legionella and strep pneumo antigen negative  Procalcitonin although may be unreliable in the setting of malignancy  Has had previous bronchoscopy in the past without any definitive organism  Also on oxygen which patient does not normally use - 1.5 L NC   Continue oxygen and taper as able    Anemia  Assessment & Plan  Likely related to patient's chemotherapy, does have pancytopenia  Hgb currently at 7.9  Transfuse for hemoglobin less than 7  LDH elevated at 533    DEMARCO (dyspnea on exertion)  Assessment & Plan  History of ongoing dyspnea on exertion, previously evaluated at [facility] with possible pneumonitis  He remains on prednisone, CT of the chest ordered for further characterization of right lower lobe infiltrate  Possibly related to patient's cancer  Low clinical suspicion for pulmonary embolism given recent negative PE study from 3 days ago, patient is fully anticoagulated with Eliquis    Neutropenia ([address])  Assessment & Plan  Continue neutropenic precautions    Malignant neoplasm metastatic to brain [facility])  Assessment & Plan  History  metastatic adenocarcinoma of the esophagus with recent admission for nivolumab pneumonitis at [facility] at 7/31/2023 . Follows with [facility] oncology. Has had multiple admissions for dyspnea on exertion with negative PE and ACS work-ups. Continue pain management and add MiraLAX and Senokot for constipation. Patient is not interested in discussing hospice at this time and would like to continue current therapy.     DVT (deep venous thrombosis) ([address])  Assessment & Plan  Anticoagulated on Eliquis    CKD (chronic kidney disease) stage 3, GFR 30-59 ml/min [facility])  Assessment & Plan  Lab Results   Component Value Date    EGFR 56 08/15/2023    EGFR 51 2023    CREATININE 1.28 08/15/2023    CREATININE 1.38 (H) 2023   Renal function around historical baseline    BPH (benign prostatic hyperplasia)  Assessment & Plan  Resume medication for BPH and monitor urine output        VTE Pharmacologic Prophylaxis:   Pharmacologic: Apixaban (Eliquis)  Mechanical VTE Prophylaxis in Place: Yes    Discharge Plan: Waiting for continued inpatient stay for IV antibiotics. Anticipate discharge home in the next 1 to 2 days    Discussions with Specialists or Other Care Team Provider: Nursing    Education and Discussions with Family / Patient: Patient    Time Spent for Care: 45 minutes. This time was spent on one or more of the following: performing physical exam; counseling and coordination of care; obtaining or reviewing history; documenting in the medical record; reviewing/ordering tests, medications, or procedures; communicating with other healthcare professionals and discussing with patient's family/caregivers. Current Length of Stay: 1 day(s)  Current Patient Status: Inpatient   Code Status: Level 3 - DNAR and DNI    Subjective:   Resting in bed. He reports feeling very tired and fatigued. Lives at home. Reports not having recent bowel movement. Added stool regimen which he is agreeable. Currently on 1.5 L of oxygen which she does not use at home. Objective:     Vitals:   Temp (24hrs), Av.7 °F (36.5 °C), Min:97.2 °F (36.2 °C), Max:98.1 °F (36.7 °C)    Temp:  [97.2 °F (36.2 °C)-98.1 °F (36.7 °C)] 98 °F (36.7 °C)  HR:  [] 94  Resp:  [14-20] 20  BP: (116-149)/(69-99) 117/84  SpO2:  [98 %-100 %] 100 %  Body mass index is 27.73 kg/m².      Input and Output Summary (last 24 hours):     No intake or output data in the 24 hours ending 08/15/23 1542    Physical Exam:     Physical Exam  Vitals and nursing note reviewed. Constitutional:       General: He is not in acute distress. Appearance: Normal appearance. He is normal weight. He is not ill-appearing, toxic-appearing or diaphoretic. Comments: Sleeping upon arrival   HENT:      Head: Normocephalic and atraumatic. Eyes:      General: No scleral icterus. Cardiovascular:      Rate and Rhythm: Normal rate and regular rhythm. Pulmonary:      Effort: No respiratory distress. Breath sounds: No stridor. No wheezing or rhonchi. Comments: On 1.5 L   Abdominal:      General: Bowel sounds are normal. There is no distension. Palpations: Abdomen is soft. There is no mass. Tenderness: There is no abdominal tenderness. Hernia: No hernia is present. Musculoskeletal:         General: No swelling. Cervical back: Neck supple. Skin:     General: Skin is warm and dry. Neurological:      Mental Status: He is oriented to person, place, and time. Mental status is at baseline. Psychiatric:         Mood and Affect: Mood normal.         Behavior: Behavior normal.         Additional Data:     Labs:    Results from last 7 days   Lab Units 08/15/23  0651   WBC Thousand/uL 2.45*   HEMOGLOBIN g/dL 7.9*   HEMATOCRIT % 23.2*   PLATELETS Thousands/uL 101*   LYMPHO PCT % 20   MONO PCT % 6   EOS PCT % 0     Results from last 7 days   Lab Units 08/15/23  0500 08/14/23  0748   POTASSIUM mmol/L 4.0 4.1   CHLORIDE mmol/L 106 105   CO2 mmol/L 23 24   BUN mg/dL 28* 29*   CREATININE mg/dL 1.28 1.38*   CALCIUM mg/dL 8.1* 8.7   ALK PHOS U/L  --  109*   ALT U/L  --  101*   AST U/L  --  42*           * I Have Reviewed All Lab Data Listed Above. * Additional Pertinent Lab Tests Reviewed:  [address] [facility] Admission Reviewed    Imaging:    Imaging Reports Reviewed Today Include:   Imaging Personally Reviewed by Myself Includes:      Recent Cultures (last 7 days):     Results from last 7 days   Lab Units 08/14/23 1955 08/14/23  1706 08/14/23  0748   BLOOD CULTURE   --  Received in Microbiology Lab. Culture in Progress. Received in Microbiology Lab. Culture in Progress. [facility] URINARY ANTIGEN  Negative  --   --        Lines/Drains:  Invasive Devices     Peripheral Intravenous Line  Duration           Peripheral IV 08/14/23 Distal;Left;Upper;Ventral (anterior) Arm 1 day                Last 24 Hours Medication List:   Current Facility-Administered Medications   Medication Dose Route Frequency Provider Last Rate   • acetaminophen  650 mg Oral Q6H PRN Christo Ziegler, DO     • albuterol  2 puff Inhalation Q6H PRN Christo Ziegler, DO     • aluminum-magnesium hydroxide-simethicone  30 mL Oral Q6H PRN Christo Ziegler, DO     • apixaban  5 mg Oral BID Christo Ziegler, DO     • azithromycin  500 mg Oral Q24H Christo Ziegler, DO     • calcium carbonate  1,000 mg Oral Daily PRN Christo Ziegler, DO     • cefTRIAXone  1,000 mg Intravenous Q24H Christo Ziegler, DO 1,000 mg (08/14/23 1828)   • cholecalciferol  1,000 Units Oral Daily Colton Puxico Dora, DO     • finasteride  5 mg Oral Daily Colton P[place]ico Dora, DO     • melatonin  10.5 mg Oral HS Colton Puxico Dora, DO     • metoprolol succinate  12.5 mg Oral BID Christo Ziegler, DO     • ondansetron  4 mg Intravenous Q6H PRN Christo Ziegler, DO     • pantoprazole  40 mg Oral Daily Before Breakfast Colton Jose Ramon Dora, DO     • polyethylene glycol  17 g Oral Daily Kelsi Sierra PA-C     • predniSONE  5 mg Oral Daily Colton Jose Ramon Dora, DO     • [START ON 8/16/2023] senna  1 tablet Oral HS JERO Sprague     • simethicone  80 mg Oral 4x Daily PRN Christo Ziegler, DO     • umeclidinium-vilanterol  1 puff Inhalation Daily Colton Jose Ramon John, DO          Today, Patient Was Seen By: Brianna Glez PA-C    ** Please Note: This note has been constructed using a voice recognition system.  **

## 2023-08-15 NOTE — ASSESSMENT & PLAN NOTE
Likely related to patient's chemotherapy, does have pancytopenia  Hgb currently at 7.9  Transfuse for hemoglobin less than 7  LDH elevated at 533

## 2023-08-15 NOTE — UTILIZATION REVIEW
Date: 8/16    Day 3: Has surpassed a 2nd midnight with active treatments and services, which include tx for PNA, imaging, oxygen, transfusion PRBCs. Initial Clinical Review    Admission: Date/Time/Statement:   Admission Orders (From admission, onward)     Ordered        08/14/23 1545  Inpatient Admission  Once                      Orders Placed This Encounter   Procedures   • Inpatient Admission     Standing Status:   Standing     Number of Occurrences:   1     Order Specific Question:   Level of Care     Answer:   Med Surg [16]     Order Specific Question:   Bed Type     Answer:   [place] [4]     Order Specific Question:   Estimated length of stay     Answer:   More than 2 Midnights     Order Specific Question:   Certification     Answer:   I certify that inpatient services are medically necessary for this patient for a duration of greater than two midnights. See H&P and MD Progress Notes for additional information about the patient's course of treatment. ED Arrival Information     Expected   -    Arrival   8/14/2023 07:08    Acuity   Emergent            Means of arrival   Ambulance    Escorted by   [facility]   Hospitalist    Admission type   Emergency            Arrival complaint   sob           Chief Complaint   Patient presents with   • Shortness of Breath     Pt states SOB for over a week, worse with activity. In the past week pt reports throat feeling swollen- last chemo treatment wed. Pt reports dull chest pain. Pt reports being diagnosed with DVT in right leg and put eliquis. Pt left arm is swollen. Initial Presentation: 77 y.o. male presents to the ED via EMS from home with c/o SOB x 2 wks and worsening evening before, felt throat closing, fullness anterior lower neck, was seen at [facility] on 8/11 with negative work up. 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 In the ED pt is tachycardic, tachypneic.  Labs - neutropenia, low H/H, elevated BUN/Cr, LFTs, T bili, BNP, procal, negative troponins. Imaging - minimal bronchiolitis, mild fibrosis, no definite malignancy. On exam  Soft tissue fullness noted to bilateral supraclavicular area, lungs clear. He is admitted to INPATIENT status with Pneumonia - Drip score 4 - IV antibiotics, sputum culture, urine antigens, oxygen, trend procal.  Anemia - transfuse hgb < 7.0, hematology consult, check LDH, BUE Venous duplex pending. DEMARCO - on Prednisone as OP to continue. Neutropenia - neutropenic prec. Pt was transfused with 1 u PRBCs. Date: 8/15   Day 2:   WBC up to > 2.0. Afebrile. On oxygen 1.5 L NC. Hgb 7.8. Creat WNL. Procal increased. 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 Pt may want to stop tx d/t multiple re-hospitalizations, to discuss with primary oncologist.  Start MiraLax and Senekot for constipation, PRN analgesia. On exam he is weak and tearful.       ED Triage Vitals [08/14/23 0717]   Temperature Pulse Respirations Blood Pressure SpO2   98.6 °F (37 °C) (!) 114 (!) 26 122/83 99 %      Temp Source Heart Rate Source Patient Position - Orthostatic VS BP Location FiO2 (%)   Oral Monitor Lying Right arm --      Pain Score       3          Wt Readings from Last 1 Encounters:   08/14/23 85.2 kg (187 lb 12.8 oz)     Additional Vital Signs:   08/17/23 0735 97.2 °F (36.2 °C) Abnormal  95 18 136/82 104 95 % -- -- None (Room air) Lying   08/16/23 2254 98.1 °F (36.7 °C) 93 18 113/84 89 99 % -- -- None (Room air) Lying   08/16/23 2010 -- -- -- -- -- -- -- -- None (Room air) --   08/16/23 1633 97 °F (36.1 °C) Abnormal  89 20 117/78 92 99 % -- -- None (Room air) Lying   08/16/23 1100 -- -- -- -- -- 99 % -- -- None (Room air) --   08/16/23 0747 97 °F (36.1 °C) Abnormal  95 20 114/70 86 100 % 36 4 L/min Nasal cannula Lying 08/16/23 0730 -- -- -- -- -- -- 32 3 L/min Nasal cannula --   08/15/23 2251 97.7 °F (36.5 °C) 89 20 117/70 -- 100 % 38 4.5 L/min Nasal cannula Lying   08/15/23 1521 98 °F (36.7 °C) 94 20 117/84 101 100 % 26 1.5 L/min Nasal cannula Lying     08/15/23 0753 97.3 °F (36.3 °C) Abnormal  101 20 131/93 100 99 % 26 1.5 L/min Nasal cannula Lying   08/15/23 0500 -- 93 -- 116/69 -- -- -- -- -- Lying   08/14/23 2322 97.2 °F (36.2 °C) Abnormal  59 19 128/81 87 98 % 28 2 L/min Nasal cannula Lying   08/14/23 1807 98.1 °F (36.7 °C) 95 19 149/99 110 99 % 28 2 L/min Nasal cannula Lying   08/14/23 1800 -- -- -- -- -- -- 28 2 L/min Nasal cannula --   08/14/23 1730 -- 92 14 133/88 105 98 % -- -- -- --   08/14/23 1725 -- 95 18 133/88 -- 98 % -- -- None (Room air) Lying   08/14/23 1535 98.2 °F (36.8 °C) 96 18 138/89 -- 98 % 28 2 L/min Nasal cannula --   08/14/23 1504 97.8 °F (36.6 °C) 99 18 131/81 -- 99 % 28 2 L/min Nasal cannula --   08/14/23 1445 -- 100 18 128/93 -- 100 % 28 2 L/min Nasal cannula Lying   08/14/23 1338 98 °F (36.7 °C) 96 18 122/78 -- 100 % 28 2 L/min Nasal cannula --   08/14/23 1323 98 °F (36.7 °C) 95 18 146/91 -- 100 % 28 2 L/min Nasal cannula Lying   08/14/23 1313 98 °F (36.7 °C) 96 14 141/91 -- 100 % -- -- -- --   08/14/23 1311 98 °F (36.7 °C) 96 18 141/91 -- 100 % 28 2 L/min Nasal cannula Lying   08/14/23 1302 97.8 °F (36.6 °C) 98 20 148/87 -- -- -- -- -- --   08/14/23 1145 -- 92 16 147/92 113 99 % 28 2 L/min Nasal cannula Lying   08/14/23 1115 -- 94 16 139/88 110 99 % 28 2 L/min Nasal cannula Lying   08/14/23 1015 -- 94 13 134/93 110 98 % -- -- -- --   08/14/23 0915 -- 96 15 138/89 109 100 % -- -- -- --   08/14/23 0815 -- 98 26 Abnormal  130/88 105 99 % 28 2 L/min Nasal cannula --     Pertinent Labs/Diagnostic Test Results:     8/14 ECG - Sinus rhythm with frequent Premature ventricular complexes  Abnormal ECG  8/14 ECG - Normal sinus rhythm  Nonspecific ST abnormality  Abnormal ECG  8/14 ECG - Sinus rhythm with frequent Premature ventricular complexes and Possible Premature atrial complexes with Aberrant conduction  Nonspecific ST and T wave abnormality  Abnormal ECG    CT chest wo contrast   Final Result by Kylah Keys MD (08/14 1702)      No definite acute disease. Small cluster of tree-in-bud nodules in the lateral right upper lobe, likely minimal bronchiolitis. Mild lower lobe basilar predominant reticulation and traction bronchiolectasis suggestive of mild fibrosis. No visible esophageal malignancy. CT soft tissue neck   Final Result by Quan Engel MD (08/14 8114)      No nodular enhancing lesions identified along the course of the aerodigestive tract. No discrete edema or collection identified. Correlate with outside prior exams for findings related to the patient's known clinical history of distal esophageal neoplasm.       XR chest 1 view portable   Faint groundglass opacity at right lung base suspicious for early infiltrate versus atelectasis      VAS upper limb venous duplex scan, unilateral/limited       No thrombus bilat         Results from last 7 days   Lab Units 08/15/23  0651 08/14/23  0748   WBC Thousand/uL 2.45* 1.44*   HEMOGLOBIN g/dL 7.9* 7.6*   HEMATOCRIT % 23.2* 22.6*   PLATELETS Thousands/uL 101* 115*   BANDS PCT % 1 2         Results from last 7 days   Lab Units 08/15/23  0500 08/14/23  0748   SODIUM mmol/L 139 139   POTASSIUM mmol/L 4.0 4.1   CHLORIDE mmol/L 106 105   CO2 mmol/L 23 24   ANION GAP mmol/L 10 10   BUN mg/dL 28* 29*   CREATININE mg/dL 1.28 1.38*   EGFR ml/min/1.73sq m 56 51   CALCIUM mg/dL 8.1* 8.7     Results from last 7 days   Lab Units 08/14/23  0748   AST U/L 42*   ALT U/L 101*   ALK PHOS U/L 109*   TOTAL PROTEIN g/dL 5.4*   ALBUMIN g/dL 3.6   TOTAL BILIRUBIN mg/dL 1.35*         Results from last 7 days   Lab Units 08/15/23  0500 08/14/23  0748   GLUCOSE RANDOM mg/dL 77 92     Results from last 7 days   Lab Units 08/14/23  1157 08/14/23  1012 08/14/23  0748   HS TNI 0HR ng/L  --   --  14   HS TNI 2HR ng/L  --  13  --    HSTNI D2 ng/L  --  -1  --    HS TNI 4HR ng/L 15  --   --    HSTNI D4 ng/L 1  --   --      Results from last 7 days   Lab Units 08/15/23  0500 08/14/23  0748   PROCALCITONIN ng/ml 0.50* 0.45*     Results from last 7 days   Lab Units 08/14/23  0748   BNP pg/mL 106*             Results from last 7 days   Lab Units 08/15/23  0530   UNIT PRODUCT CODE  [id number]   UNIT NUMBER  [id number]   UNITABO  O   UNITRH  NEG   CROSSMATCH  Compatible   UNIT DISPENSE STATUS  Presumed Trans   UNIT PRODUCT VOL mL 250         Results from last 7 days   Lab Units 08/14/23  1955   STREP PNEUMONIAE ANTIGEN, URINE  Negative   LEGIONELLA URINARY ANTIGEN  Negative     Results from last 7 days   Lab Units 08/14/23  1706 08/14/23  0748   BLOOD CULTURE  Received in Microbiology Lab. Culture in Progress. Received in Microbiology Lab. Culture in Progress.          ED Treatment:   Medication Administration from 08/14/2023 0707 to 08/14/2023 1800       Date/Time Order Dose Route Action     08/14/2023 0842 EDT iohexol (OMNIPAQUE) 350 MG/ML injection (SINGLE-DOSE) 85 mL 85 mL Intravenous Given        Past Medical History:   Diagnosis Date   • Cancer ([address])     esophageal   • DVT (deep venous thrombosis) (HCC)      Present on Admission:  • BPH (benign prostatic hyperplasia)  • CKD (chronic kidney disease) stage 3, GFR 30-59 ml/min (HCC)  • DVT (deep venous thrombosis) (HCC)  • Malignant neoplasm metastatic to brain [facility])      Admitting Diagnosis: Esophageal cancer (HCC) [C15.9]  Dyspnea [R06.00]  SOB (shortness of breath) [R06.02]  Anemia [D64.9]  Neutropenia (HCC) [D70.9]  Age/Sex: 77 y.o. male  Admission Orders:  Scheduled Medications:  apixaban, 5 mg, Oral, BID  azithromycin, 500 mg, Oral, Q24H  cefTRIAXone, 1,000 mg, Intravenous, Q24H  cholecalciferol, 1,000 Units, Oral, Daily  finasteride, 5 mg, Oral, Daily  melatonin, 10.5 mg, Oral, HS  metoprolol succinate, 12.5 mg, Oral, BID  pantoprazole, 40 mg, Oral, Daily Before Breakfast  predniSONE, 5 mg, Oral, Daily  umeclidinium-vilanterol, 1 puff, Inhalation, Daily      Continuous IV Infusions:     PRN Meds:  acetaminophen, 650 mg, Oral, Q6H PRN  albuterol, 2 puff, Inhalation, Q6H PRN  aluminum-magnesium hydroxide-simethicone, 30 mL, Oral, Q6H PRN  calcium carbonate, 1,000 mg, Oral, Daily PRN  ondansetron, 4 mg, Intravenous, Q6H PRN - x 1 8/14  simethicone, 80 mg, Oral, 4x Daily PRN    Neutropenic prec  Sputum culture  IP CONSULT TO HEMATOLOGY    Network Utilization Review Department  ATTENTION: Please call with any questions or concerns to [phone] and carefully listen to the prompts so that you are directed to the right person. All voicemails are confidential.  Kim Holley all requests for admission clinical reviews, approved or denied determinations and any other requests to dedicated fax number below belonging to the campus where the patient is receiving treatment.  List of dedicated fax numbers for the Facilities:  [place] DENIALS (Administrative/Medical Necessity) [phone]   [facility] (Maternity/NICU/Pediatrics) [phone]   [address] [place] [address] Drive [phone]   [facility] [phone]   [facility] [address] [place] [address] [id number] [place] [address] [phone]   [address] [address] [phone]

## 2023-08-15 NOTE — PLAN OF CARE
Problem: Potential for Falls  Goal: Patient will remain free of falls  Description: INTERVENTIONS:  - Educate patient/family on patient safety including physical limitations  - Instruct patient to call for assistance with activity   - Consult OT/PT to assist with strengthening/mobility   - Keep Call bell within reach  - Keep bed low and locked with side rails adjusted as appropriate  - Keep care items and personal belongings within reach  - Initiate and maintain comfort rounds  - Make Fall Risk Sign visible to staff  - Apply yellow socks and bracelet for high fall risk patients  - Consider moving patient to room near nurses station  8/15/2023 0947 by Jasmyn Santos RN  Outcome: Progressing  8/15/2023 0947 by Jasmyn Santos RN  Outcome: Progressing     Problem: MOBILITY - ADULT  Goal: Maintain or return to baseline ADL function  Description: INTERVENTIONS:  -  Assess patient's ability to carry out ADLs; assess patient's baseline for ADL function and identify physical deficits which impact ability to perform ADLs (bathing, care of mouth/teeth, toileting, grooming, dressing, etc.)  - Assess/evaluate cause of self-care deficits   - Assess range of motion  - Assess patient's mobility; develop plan if impaired  - Assess patient's need for assistive devices and provide as appropriate  - Encourage maximum independence but intervene and supervise when necessary  - Involve family in performance of ADLs  - Assess for home care needs following discharge   - Consider OT consult to assist with ADL evaluation and planning for discharge  - Provide patient education as appropriate  8/15/2023 0947 by Jasmyn Santos RN  Outcome: Progressing  8/15/2023 0947 by Jasmyn Santos RN  Outcome: Progressing  Goal: Maintains/Returns to pre admission functional level  Description: INTERVENTIONS:  - Perform BMAT or MOVE assessment daily.   - Set and communicate daily mobility goal to care team and patient/family/caregiver.    - Collaborate with rehabilitation services on mobility goals if consulted  - Out of bed for toileting  - Record patient progress and toleration of activity level   8/15/2023 0947 by Kim Smith RN  Outcome: Progressing  8/15/2023 0947 by Kim Smith, RN  Outcome: Progressing

## 2023-08-15 NOTE — ASSESSMENT & PLAN NOTE
Possible gram-negative pneumonia given infiltrate seen on chest radiograph  Drip score of 4, patient is immunocompromised  Ceftriaxone and azithromycin, day #2  Obtain sputum culture, Legionella and strep pneumo antigen negative  Procalcitonin although may be unreliable in the setting of malignancy  Has had previous bronchoscopy in the past without any definitive organism  Also on oxygen which patient does not normally use - 1.5 L NC   Continue oxygen and taper as able

## 2023-08-15 NOTE — ASSESSMENT & PLAN NOTE
History of ongoing dyspnea on exertion, previously evaluated at [facility] with possible pneumonitis  He remains on prednisone, CT of the chest ordered for further characterization of right lower lobe infiltrate  Possibly related to patient's cancer  Low clinical suspicion for pulmonary embolism given recent negative PE study from 3 days ago, patient is fully anticoagulated with Eliquis

## 2023-08-15 NOTE — OCCUPATIONAL THERAPY NOTE
Occupational Therapy Evaluation     Patient Name: Luisana Santizo  LSEFP'E Date: 8/15/2023  Problem List  Principal Problem:    Pneumonia  Active Problems:    BPH (benign prostatic hyperplasia)    CKD (chronic kidney disease) stage 3, GFR 30-59 ml/min (HCC)    DVT (deep venous thrombosis) (HCC)    Malignant neoplasm metastatic to brain (HCC)    Neutropenia (HCC)    DEMARCO (dyspnea on exertion)    Anemia    Past Medical History  Past Medical History:   Diagnosis Date    Cancer ([address])     esophageal    DVT (deep venous thrombosis) ([address])      Past Surgical History  History reviewed. No pertinent surgical history. 08/15/23 0844   OT Last Visit   OT Visit Date 08/15/23   Note Type   Note type Evaluation   Pain Assessment   Pain Assessment Tool 0-10   Pain Score No Pain   Restrictions/Precautions   Weight Bearing Precautions Per Order No   Other Precautions Multiple lines;O2;Fall Risk  (1.5 L)   Home Living   Type of Home Other (Comment)  (Trailer)   Home Layout One level;Stairs to enter with rails  (3-4 PAULINE c HR)   Bathroom Shower/Tub Tub/shower unit   Bathroom Toilet Standard   Prior Function   Level of [place] Independent with ADLs; Needs assistance with IADLS; Independent with functional mobility; Needs assistance with ADLs  (Normally Karen with ADLs however past couple days has been requiring increased (A) due to fatigue and DEMARCO. Son completes cleaning tasks. Pt able to cook own meals and manage medications.)   Lives With Freeda Minus Help From Family   IADLs Independent with driving; Independent with meal prep; Independent with medication management   Falls in the last 6 months 1 to 4  (1 fall)   Vocational On disability   Lifestyle   [employer] PTA, when pt was feeling better, pt was able to complete ADLs with Karen however last couple of days has required increased (A) with LB ADLs. Able to cook own meals and manage meals. Lives with son, who takes care of cleaning and garbage.  Patient lives in a trailer with 3-4 PAULINE c HR. Tub/shower and standard toilet. No DME/AD at baseline. No supplemental O2 at baseline, currently on 1.5 L via NC. 1 fall. (+) . Reciprocal Relationships Son   Intrinsic Gratification Watching TV   General   Additional Pertinent History Comorbidities affecting pt’s functional performance include a significant PMH of: malignant neoplasm metastatic to brain, DVT, CKD, BPH, esophageal CA. Family/Caregiver Present No   Subjective   Subjective "I'm so exhausted anytime I move at all"   ADL   Where Assessed Edge of bed   Eating Assistance 6  Modified independent   Grooming Assistance 5  Supervision/Setup   UB Bathing Assistance 4  Minimal Assistance   LB Bathing Assistance 2  Maximal Assistance   [address] 4  Minimal [facility] 2  Maximal [address] [place] [address]  3  Moderate Assistance   Bed Mobility   Supine to Sit 5  Supervision   Additional items Bedrails; Increased time required;Verbal cues;HOB elevated   Transfers   Sit to Stand 5  Supervision   Additional items Bedrails; Increased time required;Verbal cues; Other  (RW)   Stand to Sit 5  Supervision   Additional items Bedrails; Increased time required;Verbal cues; Other  (RW)   Additional Comments (S)  O2 on 1.5 L - 95-99% t/o; -128 BPM   Functional Mobility   Functional Mobility 5  Supervision   Additional items Rolling walker   Balance   Static Sitting Fair +   Dynamic Sitting Fair   Static Standing Fair -   Dynamic Standing Fair -   Ambulatory Fair -   Activity Tolerance   Activity Tolerance Patient limited by fatigue;Treatment limited secondary to medical complications (Comment)   Medical Staff Made Aware PT, RN, CM   Nurse Made Aware Yes   RUE Assessment   RUE Assessment WFL   LUE Assessment   LUE Assessment WFL   Hand Function   Gross Motor Coordination Functional   Fine Motor Coordination Functional   Sensation   Light Touch No apparent deficits   Proprioception   Proprioception No apparent deficits   Vision-Basic Assessment   Current Vision Wears glasses all the time   Vision - Complex Assessment   Ocular Range of Motion Intact   Acuity Able to read clock/calendar on wall without difficulty   Perception   Inattention/Neglect Appears intact   Cognition   Overall Cognitive Status [facility]   Arousal/Participation Alert; Responsive; Cooperative   Attention Within functional limits   Orientation Level Oriented X4   Memory Within functional limits   Following Commands Follows all commands and directions without difficulty   Assessment   Limitation Decreased ADL status; Decreased UE strength;Decreased endurance;Decreased self-care trans;Decreased high-level ADLs   Prognosis Fair   Assessment Patient is a 77y.o. year old male seen for OT eval s/p admit to [facility] on 8/14/2023 with pneumonia, neutropenia, DEMARCO, anemia. Patient with active OT orders and activity orders for Up and OOB as tolerated . Personal factors affecting pt at time of IE include: difficulty performing ADLs and IADLs, difficulty with functional mobility/transfers. Upon evaluation, patient’s functional status as follows: eating: Chiny[place], grooming: supervision , UB bathing: Ag, LB bathing: maxA, UB dressing: Ag, LB dressing: maxA, toileting: Maida Cordial; functional transfers: supervision , bed mobility: supervision , functional mobility: supervision , sitting/standing tolerance: <30 seconds with b/l UE support - due to the following deficits impacting occupational performance: weakness, decreased strength , decreased balance, decreased activity tolerance, limited functional reach and decreased cardiovascular endurance. These impairments, as well at pt’s PAULINE home environment, difficulty performing ADLs, difficulty performing IADLs, difficulty performing transfers/mobility, fall risk , functional decline , new O2 requirements, increased reliance on DME  and multiple admissions  limit pt’s ability to safely engage in all baseline areas of occupation. Patient would benefit from continued skilled OT therapy while in acute setting to address deficits as defined above and maximize (I) with ADLs and functional mobility. Occupational performance areas to address include: grooming, bathing/shower, toilet hygiene, dressing, medication management, health maintenance, functional mobility, clothing management, cleaning and meal prep. Based on the aforementioned OT evaluation, functional performance deficits, and assessments, pt has been identified as a high complexity evaluation. From OT standpoint, recommend home with HH vs STR pending progress upon D/C. OT will continue to follow pt 2-5x/wk to address the following goals to  w/in 10-14 days   Goals   Patient Goals to feel better   LTG Time Frame 10-14   Plan   Treatment Interventions ADL retraining;Functional transfer training;UE strengthening/ROM; Endurance training;Patient/family training;Equipment evaluation/education; Neuromuscular reeducation; Compensatory technique education;Continued evaluation; Energy conservation; Activityengagement;Cardiac education   Goal Expiration Date 23   OT Treatment Day 0   OT Frequency 3-5x/wk;2-3x/wk   Recommendation   OT Discharge Recommendation Post acute rehabilitation services  ([facility]PT pending progress)   Equipment Recommended Bedside commode; Other (comment); Shower/Tub chair with back ($)  (RW)   AM-PAC Daily Activity Inpatient   Lower Body Dressing 2   Bathing 2   Toileting 2   Upper Body Dressing 3   Grooming 4   Eating 4   Daily Activity Raw Score 17   Daily Activity Standardized Score (Calc for Raw Score >=11) 37.26   AM-PAC Applied Cognition Inpatient   Following a Speech/Presentation 4   Understanding Ordinary Conversation 4   Taking Medications 4   Remembering Where Things Are Placed or Put Away 4   Remembering List of 4-5 Errands 4   Taking Care of Complicated Tasks 4   Applied Cognition Raw Score 24   Applied Cognition Standardized Score 62.21 Occupational Therapy goals: In 7-14 days:     1- Patient will verbalize and demonstrate use of energy conservation/deep breathing technique and work simplification skills during functional activity with no verbal cues. 2- Patient will verbalize and demonstrate good body mechanics and joint protection techniques during ADLs/IADLs with no verbal cues   3- Pt will complete bed mobility at a Mod I level w/ G balance/safety demonstrated to decrease caregiver assistance required   4- Patient will increase OOB/ sitting tolerance to 2-4 hours per day for increased participation in self care and leisure tasks with no s/s of exertion.    5-Patient will increase standing tolerance time to 5 minutes with unilateral UE support to complete sink level ADLs@ mod I level    6- Pt will improve functional transfers to Mod I on/off all surfaces using DME as needed w/ G balance/safety   7- Patient will complete UB ADLs with Karen utilizing appropriate DME/AE PRN   8- Patient will complete LB ADLs with Karen utilizing appropriate DME/AE PRN   9- Patient will complete toileting tasks with Karen with G hygiene/thoroughness utilizing appropriate DME/AE PRN   10- Pt will improve functional mobility during ADL/IADL/leisure tasks to Mod I using DME as needed w/ G balance/safety    11- Pt will be attentive 100% of the time during ongoing cognitive assessment w/ G participation to assist w/ safe d/c planning/recommendations   12- Pt will participate in simulated IADL management task to increase independence to Mod I w/ G safety and endurance   13- Pt will increase BUE strength by 1MM grade via AROM/AAROM/PROM exercises to increase independence in ADLs and transfers         Briana Mora OTR/L

## 2023-08-15 NOTE — ASSESSMENT & PLAN NOTE
Lab Results   Component Value Date    EGFR 56 08/15/2023    EGFR 51 08/14/2023    CREATININE 1.28 08/15/2023    CREATININE 1.38 (H) 08/14/2023   Renal function around historical baseline

## 2023-08-16 LAB
ANION GAP SERPL CALCULATED.3IONS-SCNC: 6 MMOL/L
ATRIAL RATE: 93 BPM
BUN SERPL-MCNC: 23 MG/DL (ref 5–25)
CALCIUM SERPL-MCNC: 7.7 MG/DL (ref 8.4–10.2)
CHLORIDE SERPL-SCNC: 108 MMOL/L (ref 96–108)
CO2 SERPL-SCNC: 26 MMOL/L (ref 21–32)
CREAT SERPL-MCNC: 1.25 MG/DL (ref 0.6–1.3)
ERYTHROCYTE [DISTWIDTH] IN BLOOD BY AUTOMATED COUNT: 22.7 % (ref 11.6–15.1)
GFR SERPL CREATININE-BSD FRML MDRD: 57 ML/MIN/1.73SQ M
GLUCOSE SERPL-MCNC: 88 MG/DL (ref 65–140)
HCT VFR BLD AUTO: 22.2 % (ref 36.5–49.3)
HGB BLD-MCNC: 7.3 G/DL (ref 12–17)
MCH RBC QN AUTO: 34.6 PG (ref 26.8–34.3)
MCHC RBC AUTO-ENTMCNC: 32.9 G/DL (ref 31.4–37.4)
MCV RBC AUTO: 105 FL (ref 82–98)
P AXIS: 5 DEGREES
PLATELET # BLD AUTO: 83 THOUSANDS/UL (ref 149–390)
PMV BLD AUTO: 8.9 FL (ref 8.9–12.7)
POTASSIUM SERPL-SCNC: 3.6 MMOL/L (ref 3.5–5.3)
PR INTERVAL: 112 MS
PROCALCITONIN SERPL-MCNC: 0.39 NG/ML
QRS AXIS: 44 DEGREES
QRSD INTERVAL: 82 MS
QT INTERVAL: 342 MS
QTC INTERVAL: 425 MS
RBC # BLD AUTO: 2.11 MILLION/UL (ref 3.88–5.62)
SODIUM SERPL-SCNC: 140 MMOL/L (ref 135–147)
T WAVE AXIS: 64 DEGREES
VENTRICULAR RATE: 93 BPM
WBC # BLD AUTO: 2.69 THOUSAND/UL (ref 4.31–10.16)

## 2023-08-16 PROCEDURE — 85027 COMPLETE CBC AUTOMATED: CPT | Performed by: PHYSICIAN ASSISTANT

## 2023-08-16 PROCEDURE — 84145 PROCALCITONIN (PCT): CPT | Performed by: PHYSICIAN ASSISTANT

## 2023-08-16 PROCEDURE — 99232 SBSQ HOSP IP/OBS MODERATE 35: CPT | Performed by: PHYSICIAN ASSISTANT

## 2023-08-16 PROCEDURE — 93010 ELECTROCARDIOGRAM REPORT: CPT | Performed by: INTERNAL MEDICINE

## 2023-08-16 PROCEDURE — 80048 BASIC METABOLIC PNL TOTAL CA: CPT | Performed by: PHYSICIAN ASSISTANT

## 2023-08-16 PROCEDURE — 93005 ELECTROCARDIOGRAM TRACING: CPT

## 2023-08-16 RX ORDER — LORAZEPAM 2 MG/ML
0.5 INJECTION INTRAMUSCULAR EVERY 6 HOURS PRN
Status: DISCONTINUED | OUTPATIENT
Start: 2023-08-16 | End: 2023-08-20 | Stop reason: HOSPADM

## 2023-08-16 RX ORDER — LIDOCAINE 50 MG/G
1 PATCH TOPICAL DAILY
Status: DISCONTINUED | OUTPATIENT
Start: 2023-08-16 | End: 2023-08-20 | Stop reason: HOSPADM

## 2023-08-16 RX ADMIN — CEFTRIAXONE 1000 MG: 1 INJECTION, SOLUTION INTRAVENOUS at 17:20

## 2023-08-16 RX ADMIN — Medication 2.5 MG: at 05:13

## 2023-08-16 RX ADMIN — ACETAMINOPHEN 325MG 650 MG: 325 TABLET ORAL at 05:13

## 2023-08-16 RX ADMIN — LIDOCAINE 1 PATCH: 50 PATCH CUTANEOUS at 05:14

## 2023-08-16 RX ADMIN — Medication 10.5 MG: at 21:04

## 2023-08-16 RX ADMIN — APIXABAN 5 MG: 5 TABLET, FILM COATED ORAL at 17:20

## 2023-08-16 RX ADMIN — LORAZEPAM 0.5 MG: 2 INJECTION INTRAMUSCULAR; INTRAVENOUS at 16:45

## 2023-08-16 RX ADMIN — ACETAMINOPHEN 325MG 650 MG: 325 TABLET ORAL at 15:04

## 2023-08-16 RX ADMIN — Medication 1000 UNITS: at 08:18

## 2023-08-16 RX ADMIN — UMECLIDINIUM BROMIDE AND VILANTEROL TRIFENATATE 1 PUFF: 62.5; 25 POWDER RESPIRATORY (INHALATION) at 08:18

## 2023-08-16 RX ADMIN — PREDNISONE 5 MG: 5 TABLET ORAL at 08:18

## 2023-08-16 RX ADMIN — SODIUM CHLORIDE 75 ML/HR: 0.9 INJECTION, SOLUTION INTRAVENOUS at 07:06

## 2023-08-16 RX ADMIN — PANTOPRAZOLE SODIUM 40 MG: 40 TABLET, DELAYED RELEASE ORAL at 05:13

## 2023-08-16 RX ADMIN — ACETAMINOPHEN 325MG 650 MG: 325 TABLET ORAL at 22:56

## 2023-08-16 RX ADMIN — SENNOSIDES 8.6 MG: 8.6 TABLET, FILM COATED ORAL at 21:05

## 2023-08-16 RX ADMIN — FINASTERIDE 5 MG: 5 TABLET, FILM COATED ORAL at 08:18

## 2023-08-16 RX ADMIN — APIXABAN 5 MG: 5 TABLET, FILM COATED ORAL at 08:19

## 2023-08-16 RX ADMIN — AZITHROMYCIN 500 MG: 250 TABLET, FILM COATED ORAL at 17:20

## 2023-08-16 RX ADMIN — METOPROLOL SUCCINATE 12.5 MG: 25 TABLET, EXTENDED RELEASE ORAL at 17:20

## 2023-08-16 RX ADMIN — METOPROLOL SUCCINATE 12.5 MG: 25 TABLET, EXTENDED RELEASE ORAL at 08:18

## 2023-08-16 NOTE — CASE MANAGEMENT
Case Management Assessment & Discharge Planning Note    Patient name Lia Marrero  Location [facility] [address] -* MRN [id number]  : 1955 Date 2023       Current Admission Date: 2023  Current Admission Diagnosis:Pneumonia   Patient Active Problem List    Diagnosis Date Noted   • Pneumonia 2023   • Neutropenia ([address]) 2023   • DEMARCO (dyspnea on exertion) 2023   • Anemia 2023   • DVT (deep venous thrombosis) ([address]) 2023   • CKD (chronic kidney disease) stage 3, GFR 30-59 ml/min (HCC) 05/15/2023   • Malignant neoplasm metastatic to brain ([address]) 2023   • BPH (benign prostatic hyperplasia) 2022   • Esophageal cancer ([address]) 2022      LOS (days): 2  Geometric Mean LOS (GMLOS) (days): 4.00  Days to GMLOS:2     OBJECTIVE:    Risk of Unplanned Readmission Score: 19.28         Current admission status: Inpatient       Preferred Pharmacy:   [facility], [address] [place] [address], [address]  [address] [place] [address], [facility] [address]  Phone: [phone] Fax: [phone]    Primary Care Provider: No primary care provider on file. Primary Insurance: [facility]  Secondary Insurance:     ASSESSMENT:  Lan Proxies     jai, [address] [place] [address] [address] Representative - Son   Primary Phone: [phone] (Home)                         Readmission Root Cause  30 Day Readmission: No    Patient Information  Admitted from[de-identified] Home  Mental Status: Alert  During Assessment patient was accompanied by: Not accompanied during assessment  Assessment information provided by[de-identified] Son  Primary Caregiver: Self  Support Systems: Self, Daughter, Son  [facility]: [address] [place] [address] do you live in?: [address] entry access options.  Select all that apply.: Stairs  Number of steps to enter home.: 3  Do the steps have railings?: Yes  Type of Current Residence: [address] home  Upon entering residence, is there a bedroom on the main floor (no further steps)?: Yes  Upon entering residence, is there a bathroom on the main floor (no further steps)?: Yes  In the last 12 months, was there a time when you were not able to pay the mortgage or rent on time?: No  In the last 12 months, how many places have you lived?: 2  In the last 12 months, was there a time when you did not have a steady place to sleep or slept in a shelter (including now)?: No  Homeless/housing insecurity resource given?: N/A  Living Arrangements: Lives w/ Son    Activities of Daily Living Prior to Admission  Functional Status: Independent  Completes ADLs independently?: Yes  Ambulates independently?: Yes  Does patient use assisted devices?: No  Does patient currently own DME?: No  Does patient have a history of Outpatient Therapy (PT/OT)?: No  Does the patient have a history of Short-Term Rehab?: No  Does patient have a history of HHC?: No  Does patient currently have [address]?: No         Patient Information Continued  Income Source: Pension/group home  Does patient have prescription coverage?: Yes  Within the past 12 months, you worried that your food would run out before you got the money to buy more.: Never true  Within the past 12 months, the food you bought just didn't last and you didn't have money to get more.: Never true  Food insecurity resource given?: N/A  Does patient receive dialysis treatments?: No  Does patient have a history of substance abuse?: No  Does patient have a history of Mental Health Diagnosis?: No         Means of Transportation  Means of Transport to Appts[de-identified] Drives Self  In the past 12 months, has lack of transportation kept you from medical appointments or from getting medications?: No  In the past 12 months, has lack of transportation kept you from meetings, work, or from getting things needed for daily living?: No  Was application for public transport provided?: N/A        DISCHARGE DETAILS:    Discharge planning discussed with[de-identified] Son, Christine Weiss        THIERRY contacted family/caregiver?: Yes  Were Treatment Team discharge recommendations reviewed with patient/caregiver?: Yes  Did patient/caregiver verbalize understanding of patient care needs?: Yes  Were patient/caregiver advised of the risks associated with not following Treatment Team discharge recommendations?: Yes    Contacts  Patient Contacts: Nirmal Jimenez (son) [phone]  Relationship to Patient[de-identified] Family  Contact Method: Phone  Phone Number: [phone]  Reason/Outcome: Continuity of Care, Emergency Contact, Discharge Planning                        Treatment Team Recommendation: Short Term Rehab                                            Additional Comments: CM called son Kofi Goldsmith to discuss pt d/c plan. CM attempted to speak w/ pt but did not feel up for conversation; asked if I can call his son instead. Son had questions about PT/OT recommendations, STR & options for pt. CM explained process of STR, HH & other options for pt. Son would like to discuss options w/ pt later this evening and get back to me tomorrow. CM agreed to confirm a set d/c plan tomorrow. Son denied having any other questions or concerns at this time. CM to continue to follow pt care & d/c.

## 2023-08-16 NOTE — PROGRESS NOTES
[facility]  Progress Note  Name: Monique Samaniego  MRN: [id number]  Unit/Bed#: E4 -01 I Date of Admission: 8/14/2023   Date of Service: 8/16/2023 I Hospital Day: 2    Assessment/Plan   * Pneumonia  Assessment & Plan  Possible ammonia given infiltrate seen on imaging  Drip score of 4, patient is immunocompromised  Continue IV antibiotics-ceftriaxone and azithromycin, day #3  Sputum culture pending  Legionella and strep pneumo antigen negative  Procalcitonin elevated although may be unreliable in the setting of malignancy  Has had previous bronchoscopy in the past without any definitive organism  Also on oxygen which patient does not normally use -now on 4 L NC   Low suspicion for acute PE - reports compliance with eliquis   Continue oxygen and taper as able  PT/OT recommending STR vs HHT pending progress    Malignant neoplasm metastatic to brain [facility])  Assessment & Plan  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 Patient is contemplating hospice/palliative care but would like to continue current therapy at this time. Encouraged outpt followup with his providers at [address] to discuss  No recommendations for G-CSF at this time per oncology  Oncology coordinating with [facility] oncology while patient is hospitalized     DEMARCO (dyspnea on exertion)  Assessment & Plan  History of ongoing dyspnea on exertion, previously evaluated at [facility] with possible pneumonitis  He remains on prednisone, CT of the chest ordered for further characterization of right lower lobe infiltrate  - Possibly related to patient's cancer. Low clinical suspicion for pulmonary embolism given recent negative PE study from 3 days ago, patient is fully anticoagulated with Eliquis- reports compliance.  Has had multiple admissions for dyspnea on exertion with negative PE and ACS work-ups. Anemia  Assessment & Plan  Likely related to patient's chemotherapy, does have pancytopenia  Hgb currently 7.9 -- 7.3  Transfuse for hemoglobin less than 7    Neutropenia (HCC)  Assessment & Plan  Continue neutropenic precautions    DVT (deep venous thrombosis) ([facility])  Assessment & Plan  Anticoagulated on Eliquis  Reports compliance    CKD (chronic kidney disease) stage 3, GFR 30-59 ml/min [facility])  Assessment & Plan  Lab Results   Component Value Date    EGFR 57 08/16/2023    EGFR 56 08/15/2023    EGFR 51 08/14/2023    CREATININE 1.25 08/16/2023    CREATININE 1.28 08/15/2023    CREATININE 1.38 (H) 08/14/2023   Renal function around historical baseline    BPH (benign prostatic hyperplasia)  Assessment & Plan  Resume medication for BPH and monitor urine output      VTE Pharmacologic Prophylaxis:   Pharmacologic: Apixaban (Eliquis)  Mechanical VTE Prophylaxis in Place: Yes    Discharge Plan: with need for continued inpatient stay for pneumonia- IV antibiotics, oxygen requirements, PT/OT - possible HHT vs STR    Discussions with Specialists or Other Care Team Provider: nursing    Education and Discussions with Family / Patient: patient, son via telephone- updated    Time Spent for Care: 45 minutes. This time was spent on one or more of the following: performing physical exam; counseling and coordination of care; obtaining or reviewing history; documenting in the medical record; reviewing/ordering tests, medications, or procedures; communicating with other healthcare professionals and discussing with patient's family/caregivers. Current Length of Stay: 2 day(s)  Current Patient Status: Inpatient   Code Status: Level 3 - DNAR and DNI    Subjective:   Patient resting in bed sleeping upon arrival.  He continues on oxygen and has increased up to 4 L today. He does report his breathing feels better however. Very weak overall.   Had conversation in regards to continuing current care versus hospice/palliative route. Patient still deciding. Would like to follow-up with his specialist at [facility] before making further decisions. Objective:     Vitals:   Temp (24hrs), Av.6 °F (36.4 °C), Min:97 °F (36.1 °C), Max:98 °F (36.7 °C)    Temp:  [97 °F (36.1 °C)-98 °F (36.7 °C)] 97 °F (36.1 °C)  HR:  [89-95] 95  Resp:  [20] 20  BP: (114-117)/(70-84) 114/70  SpO2:  [99 %-100 %] 99 %  Body mass index is 27.73 kg/m². Input and Output Summary (last 24 hours): Intake/Output Summary (Last 24 hours) at 2023 1229  Last data filed at 2023 0706  Gross per 24 hour   Intake 1043.75 ml   Output 300 ml   Net 743.75 ml       Physical Exam:     Physical Exam  Vitals and nursing note reviewed. Constitutional:       General: He is not in acute distress. Appearance: Normal appearance. He is normal weight. He is not ill-appearing, toxic-appearing or diaphoretic. Comments: Chronically ill-appearing   HENT:      Head: Normocephalic and atraumatic. Eyes:      General: No scleral icterus. Cardiovascular:      Rate and Rhythm: Normal rate and regular rhythm. Pulmonary:      Effort: No respiratory distress. Breath sounds: No wheezing or rhonchi. Comments: On 4 L NC  Abdominal:      General: Bowel sounds are normal. There is no distension. Palpations: Abdomen is soft. There is no mass. Tenderness: There is no abdominal tenderness. Hernia: No hernia is present. Musculoskeletal:         General: No swelling. Skin:     General: Skin is warm and dry. Neurological:      Mental Status: He is oriented to person, place, and time. Mental status is at baseline.    Psychiatric:         Mood and Affect: Mood normal.         Behavior: Behavior normal.         Additional Data:     Labs:    Results from last 7 days   Lab Units 23  0636 08/15/23  0651   WBC Thousand/uL 2.69* 2.45*   HEMOGLOBIN g/dL 7.3* 7.9*   HEMATOCRIT % 22.2* 23.2*   PLATELETS Thousands/uL 83* 101* LYMPHO PCT %  --  20   MONO PCT %  --  6   EOS PCT %  --  0     Results from last 7 days   Lab Units 08/16/23  0636 08/15/23  0500 08/14/23  0748   POTASSIUM mmol/L 3.6   < > 4.1   CHLORIDE mmol/L 108   < > 105   CO2 mmol/L 26   < > 24   BUN mg/dL 23   < > 29*   CREATININE mg/dL 1.25   < > 1.38*   CALCIUM mg/dL 7.7*   < > 8.7   ALK PHOS U/L  --   --  109*   ALT U/L  --   --  101*   AST U/L  --   --  42*    < > = values in this interval not displayed. * I Have Reviewed All Lab Data Listed Above. * Additional Pertinent Lab Tests Reviewed: [address] Admission Reviewed    Imaging:    Imaging Reports Reviewed Today Include: ct chest  Imaging Personally Reviewed by Myself Includes:      Recent Cultures (last 7 days):     Results from last 7 days   Lab Units 08/15/23  1226 08/14/23  1955 08/14/23  1706 08/14/23  0748   BLOOD CULTURE   --   --  No Growth at 24 hrs. No Growth at 24 hrs.    GRAM STAIN RESULT  1+ Epithelial cells per low power field*  1+ Polys*  1+ Budding yeast*  1+ Gram positive cocci in pairs*  --   --   --    LEGIONELLA URINARY ANTIGEN   --  Negative  --   --        Lines/Drains:  Invasive Devices     Peripheral Intravenous Line  Duration           Peripheral IV 08/14/23 Distal;Left;Upper;Ventral (anterior) Arm 2 days                Last 24 Hours Medication List:   Current Facility-Administered Medications   Medication Dose Route Frequency Provider Last Rate   • acetaminophen  650 mg Oral Q6H PRN Elizabeth Milch, DO     • albuterol  2 puff Inhalation Q6H PRN Elizabeth Milch, DO     • aluminum-magnesium hydroxide-simethicone  30 mL Oral Q6H PRN Elizabeth Milch, DO     • apixaban  5 mg Oral BID Elizabeth Milch, DO     • azithromycin  500 mg Oral Q24H Elizabeth Milch, DO     • calcium carbonate  1,000 mg Oral Daily PRN Elizabeth Milch, DO     • cefTRIAXone  1,000 mg Intravenous Q24H Elizabeth Milch, DO 1,000 mg (08/15/23 1715)   • cholecalciferol  1,000 Units Oral Daily Colton Marilyne Baize, DO     • finasteride  5 mg Oral Daily Colton Isabellene Baize, DO     • lidocaine  1 patch Topical Daily Artist PAULY Steele     • melatonin  10.5 mg Oral HS Colton Marilyne Baize, DO     • metoprolol succinate  12.5 mg Oral BID Annelise Floss, DO     • ondansetron  4 mg Intravenous Q6H PRN Annelise Floss, DO     • pantoprazole  40 mg Oral Daily Before Breakfast Coltonruth ann Waltonne Joyceze, DO     • polyethylene glycol  17 g Oral Daily Kelsi Sierra PA-C     • predniSONE  5 mg Oral Daily Colton Isabellene Baize, DO     • senna  1 tablet Oral HS JERO Jung     • simethicone  80 mg Oral 4x Daily PRN Annelise Floss, DO     • umeclidinium-vilanterol  1 puff Inhalation Daily Colton Guzman, DO          Today, Patient Was Seen By: Chevy Ferrara PA-C    ** Please Note: This note has been constructed using a voice recognition system.  **

## 2023-08-16 NOTE — PLAN OF CARE
Problem: Potential for Falls  Goal: Patient will remain free of falls  Description: INTERVENTIONS:  - Educate patient/family on patient safety including physical limitations  - Instruct patient to call for assistance with activity   - Consult OT/PT to assist with strengthening/mobility   - Keep Call bell within reach  - Keep bed low and locked with side rails adjusted as appropriate  - Keep care items and personal belongings within reach  - Initiate and maintain comfort rounds  - Make Fall Risk Sign visible to staff  - Apply yellow socks and bracelet for high fall risk patients  - Consider moving patient to room near nurses station  Outcome: Progressing     Problem: MOBILITY - ADULT  Goal: Maintain or return to baseline ADL function  Description: INTERVENTIONS:  -  Assess patient's ability to carry out ADLs; assess patient's baseline for ADL function and identify physical deficits which impact ability to perform ADLs (bathing, care of mouth/teeth, toileting, grooming, dressing, etc.)  - Assess/evaluate cause of self-care deficits   - Assess range of motion  - Assess patient's mobility; develop plan if impaired  - Assess patient's need for assistive devices and provide as appropriate  - Encourage maximum independence but intervene and supervise when necessary  - Involve family in performance of ADLs  - Assess for home care needs following discharge   - Consider OT consult to assist with ADL evaluation and planning for discharge  - Provide patient education as appropriate  Outcome: Progressing  Goal: Maintains/Returns to pre admission functional level  Description: INTERVENTIONS:  - Perform BMAT or MOVE assessment daily.   - Set and communicate daily mobility goal to care team and patient/family/caregiver.    - Collaborate with rehabilitation services on mobility goals if consulted  - Out of bed for toileting  - Record patient progress and toleration of activity level   Outcome: Progressing     Problem: Prexisting or High Potential for Compromised Skin Integrity  Goal: Skin integrity is maintained or improved  Description: INTERVENTIONS:  - Identify patients at risk for skin breakdown  - Assess and monitor skin integrity  - Assess and monitor nutrition and hydration status  - Monitor labs   - Assess for incontinence   - Turn and reposition patient  - Assist with mobility/ambulation  - Relieve pressure over bony prominences  - Avoid friction and shearing  - Provide appropriate hygiene as needed including keeping skin clean and dry  - Evaluate need for skin moisturizer/barrier cream  - Collaborate with interdisciplinary team   - Patient/family teaching  - Consider wound care consult   Outcome: Progressing

## 2023-08-16 NOTE — ASSESSMENT & PLAN NOTE
Possible ammonia given infiltrate seen on imaging  Drip score of 4, patient is immunocompromised  Continue IV antibiotics-ceftriaxone and azithromycin, day #3  Sputum culture pending  Legionella and strep pneumo antigen negative  Procalcitonin elevated although may be unreliable in the setting of malignancy  Has had previous bronchoscopy in the past without any definitive organism  Also on oxygen which patient does not normally use -now on 4 L NC   Low suspicion for acute PE - reports compliance with eliquis   Continue oxygen and taper as able  PT/OT recommending STR vs HHT pending progress

## 2023-08-16 NOTE — ASSESSMENT & PLAN NOTE
Lab Results   Component Value Date    EGFR 57 08/16/2023    EGFR 56 08/15/2023    EGFR 51 08/14/2023    CREATININE 1.25 08/16/2023    CREATININE 1.28 08/15/2023    CREATININE 1.38 (H) 08/14/2023   Renal function around historical baseline

## 2023-08-16 NOTE — ASSESSMENT & PLAN NOTE
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 Patient is contemplating hospice/palliative care but would like to continue current therapy at this time.   Encouraged outpt followup with his providers at [facility] to discuss  No recommendations for G-CSF at this time per oncology  Oncology coordinating with [facility] oncology while patient is hospitalized

## 2023-08-16 NOTE — ASSESSMENT & PLAN NOTE
Likely related to patient's chemotherapy, does have pancytopenia  Hgb currently 7.9 -- 7.3  Transfuse for hemoglobin less than 7

## 2023-08-16 NOTE — ASSESSMENT & PLAN NOTE
History of ongoing dyspnea on exertion, previously evaluated at [facility] with possible pneumonitis  He remains on prednisone, CT of the chest ordered for further characterization of right lower lobe infiltrate  - Possibly related to patient's cancer. Low clinical suspicion for pulmonary embolism given recent negative PE study from 3 days ago, patient is fully anticoagulated with Eliquis- reports compliance. Has had multiple admissions for dyspnea on exertion with negative PE and ACS work-ups.

## 2023-08-17 LAB
BACTERIA SPT RESP CULT: ABNORMAL
BACTERIA SPT RESP CULT: ABNORMAL
GRAM STN SPEC: ABNORMAL

## 2023-08-17 PROCEDURE — 99232 SBSQ HOSP IP/OBS MODERATE 35: CPT | Performed by: PHYSICIAN ASSISTANT

## 2023-08-17 PROCEDURE — 97163 PT EVAL HIGH COMPLEX 45 MIN: CPT

## 2023-08-17 PROCEDURE — 99232 SBSQ HOSP IP/OBS MODERATE 35: CPT | Performed by: NURSE PRACTITIONER

## 2023-08-17 PROCEDURE — 99222 1ST HOSP IP/OBS MODERATE 55: CPT | Performed by: INTERNAL MEDICINE

## 2023-08-17 RX ORDER — LEVOFLOXACIN 750 MG/1
750 TABLET ORAL EVERY 24 HOURS
Status: DISCONTINUED | OUTPATIENT
Start: 2023-08-17 | End: 2023-08-20 | Stop reason: HOSPADM

## 2023-08-17 RX ORDER — LEVALBUTEROL INHALATION SOLUTION 1.25 MG/3ML
1.25 SOLUTION RESPIRATORY (INHALATION) EVERY 6 HOURS PRN
Status: DISCONTINUED | OUTPATIENT
Start: 2023-08-17 | End: 2023-08-20 | Stop reason: HOSPADM

## 2023-08-17 RX ORDER — SODIUM CHLORIDE 9 MG/ML
50 INJECTION, SOLUTION INTRAVENOUS CONTINUOUS
Status: DISPENSED | OUTPATIENT
Start: 2023-08-17 | End: 2023-08-18

## 2023-08-17 RX ORDER — PREDNISONE 20 MG/1
60 TABLET ORAL DAILY
Status: DISCONTINUED | OUTPATIENT
Start: 2023-08-18 | End: 2023-08-20 | Stop reason: HOSPADM

## 2023-08-17 RX ADMIN — UMECLIDINIUM BROMIDE AND VILANTEROL TRIFENATATE 1 PUFF: 62.5; 25 POWDER RESPIRATORY (INHALATION) at 09:46

## 2023-08-17 RX ADMIN — SENNOSIDES 8.6 MG: 8.6 TABLET, FILM COATED ORAL at 21:35

## 2023-08-17 RX ADMIN — LORAZEPAM 0.5 MG: 2 INJECTION INTRAMUSCULAR; INTRAVENOUS at 00:52

## 2023-08-17 RX ADMIN — Medication 10.5 MG: at 21:35

## 2023-08-17 RX ADMIN — LIDOCAINE 1 PATCH: 50 PATCH CUTANEOUS at 09:44

## 2023-08-17 RX ADMIN — METOPROLOL SUCCINATE 12.5 MG: 25 TABLET, EXTENDED RELEASE ORAL at 09:44

## 2023-08-17 RX ADMIN — PREDNISONE 5 MG: 5 TABLET ORAL at 09:44

## 2023-08-17 RX ADMIN — SODIUM CHLORIDE 50 ML/HR: 0.9 INJECTION, SOLUTION INTRAVENOUS at 15:31

## 2023-08-17 RX ADMIN — POLYETHYLENE GLYCOL 3350 17 G: 17 POWDER, FOR SOLUTION ORAL at 09:49

## 2023-08-17 RX ADMIN — FILGRASTIM-AAFI 480 MCG: 480 INJECTION, SOLUTION SUBCUTANEOUS at 17:33

## 2023-08-17 RX ADMIN — PANTOPRAZOLE SODIUM 40 MG: 40 TABLET, DELAYED RELEASE ORAL at 06:36

## 2023-08-17 RX ADMIN — LORAZEPAM 0.5 MG: 2 INJECTION INTRAMUSCULAR; INTRAVENOUS at 15:30

## 2023-08-17 RX ADMIN — FINASTERIDE 5 MG: 5 TABLET, FILM COATED ORAL at 09:44

## 2023-08-17 RX ADMIN — APIXABAN 5 MG: 5 TABLET, FILM COATED ORAL at 17:33

## 2023-08-17 RX ADMIN — LEVOFLOXACIN 750 MG: 750 TABLET, FILM COATED ORAL at 16:15

## 2023-08-17 RX ADMIN — Medication 1000 UNITS: at 09:44

## 2023-08-17 RX ADMIN — LORAZEPAM 0.5 MG: 2 INJECTION INTRAMUSCULAR; INTRAVENOUS at 21:35

## 2023-08-17 RX ADMIN — APIXABAN 5 MG: 5 TABLET, FILM COATED ORAL at 09:44

## 2023-08-17 RX ADMIN — METOPROLOL SUCCINATE 12.5 MG: 25 TABLET, EXTENDED RELEASE ORAL at 17:33

## 2023-08-17 RX ADMIN — ACETAMINOPHEN 325MG 650 MG: 325 TABLET ORAL at 06:36

## 2023-08-17 NOTE — ASSESSMENT & PLAN NOTE
History of ongoing dyspnea on exertion, previously evaluated at [facility] with possible pneumonitis  He remains on prednisone, CT of the chest ordered for further characterization of right lower lobe infiltrate  - Possibly related to patient's cancer. Low clinical suspicion for pulmonary embolism given recent negative PE study from 3 days ago, patient is fully anticoagulated with Eliquis- reports compliance. Has had multiple admissions for dyspnea on exertion with negative PE and ACS work-ups.

## 2023-08-17 NOTE — CONSULTS
Consultation - [address]  77 y.o.  male  [place] and [place] /E4 MS 18-*   MRN: [id number]  Encounter: [id number]    ASSESSMENT:    Patient Active Problem List   Diagnosis   • BPH (benign prostatic hyperplasia)   • CKD (chronic kidney disease) stage 3, GFR 30-59 ml/min (HCC)   • DVT (deep venous thrombosis) (HCC)   • Malignant neoplasm metastatic to brain [facility])   • Esophageal cancer (HCC)   • Pneumonia   • Neutropenia (HCC)   • DEMARCO (dyspnea on exertion)   • Anemia       Active problems addressed:  Metastatic esophageal cancer  Brain metastases s/p RT  Shortness of breath  Acute hypoxic respiratory failure  Goals of care    Consult is for goals of care    PLAN:    1. Goals:   • Patient appears to have the capacity to make complex medical decisions on my visit today. He displayed very good judgement, insight and understanding of his disease process. • He is aware of his choices - continue cancer cares versus hospice at home. • At this time, he is NOT READY for hospice. He wants to discuss with his sons and oncology team at [facility] about his prognosis and treatment options. He is thinking of stopping treatments all together if his cancer has progressed but he wants to talk to his team about this first.   • He feels that he is not going to be around for long, feels that he is dying. He cried when talking about this. Emotional support provided. He said he is not afraid to die, he lived a very good and interesting life, but he is not ready for this and cannot believe that this is how he will leave the earth. • Please refer to [facility] palliative care. He plans to continue all cancer cares at [facility]. • Palliative will sign off. Goals are clear. He is already a level 3. Pls re-engage us if situation changes and goals are again needed to be addressed.    • D/w SLIM via TT    Code status: Level 3 - DNAR and DNI   Decisional apparatus:  Patient does have capacity to make medical decisions on my exam today. If such capacity is lost, patient's substitute decision maker would default to sons by [place] Act 169. Advance Directive / Living Will / POLST:  None on file    We appreciate the opportunity to participate in this patient's care. We will continue to follow. Please do not hesitate to contact our on-call provider through our clinic answering service at [phone] should you have acute symptom control concerns. IDENTIFICATION:  Inpatient consult to Palliative Care  Consult performed by: Nan Randle MD  Consult ordered by: Justino Estrada PA-C        Reason for Consult / Principal Problem: goals of care    HISTORY OF PRESENT ILLNESS:    Chantell Coles is a 77 y.o. male with esophageal adenocarcinoma with metastases to the brain. He is receiving cancer cares in [facility], currently on leucovorin, oxaliplatin, fluorouracil [5-FU], last one on 8/9/2023. He is currently admitted since 8/14/2023 for the treatment of acute hypoxic respiratory failure and pneumonia. Palliative medicine to discuss goals of care. Met with patient who was alone in his room. He was seen AAO x 3 in bed. Introduced palliative medicine. When I told him I was there to talk about his goals, he made it very clear to me that he is not going to make a decision about hospice until after he speaks with his sons and [facility] oncology team (medonc and radonc). He plans to follow with them on dispo. He said he cannot be forced out and wants to continue receiving treatments. Provided assurance that we are not forcing him out and that he is going to get the treatments he need. We are only clarifying goals because he has mentioned wanting to stop cancer treatment. He made it clear to me what he meant. While he may consider this in the future depending on circumstances, he is not saying he is stopping this now. He is aware that hospice is the next option when he is ready to stop treatments.  He is not opposed to this, in fact accepting of this, for when he is ready. He started to cry about his situation. He keeps getting sick from his chemo. He wants to talk to his oncologist about this because things need to change. He is not sure if he has other treatment options left. His wife  in . He lives with his sone. He has 3 children but he has lost contact with one of them and hasn't seen/spoken to them in years. He noted that his 2 sons will be his next of kin for decision making. Interview and exam limited by: none    Review of Systems   Constitutional: Positive for activity change, appetite change, fatigue and unexpected weight change. HENT: Positive for trouble swallowing. Respiratory: Positive for shortness of breath. Gastrointestinal: Negative for abdominal pain, constipation, diarrhea, nausea and vomiting. Musculoskeletal:        Anterior chest wall pain   Neurological: Positive for weakness. Psychiatric/Behavioral: Negative for suicidal ideas. All other systems reviewed and are negative. Past Medical History:   Diagnosis Date   • Cancer ([address])     esophageal   • DVT (deep venous thrombosis) ([address])      History reviewed. No pertinent surgical history. Social History     Socioeconomic History   • Marital status:       Spouse name: Not on file   • Number of children: Not on file   • Years of education: Not on file   • Highest education level: Not on file   Occupational History   • Not on file   Tobacco Use   • Smoking status: Former     Types: Cigarettes   • Smokeless tobacco: Not on file   Vaping Use   • Vaping Use: Not on file   Substance and Sexual Activity   • Alcohol use: Not Currently   • Drug use: Never   • Sexual activity: Not Currently   Other Topics Concern   • Not on file   Social History Narrative   • Not on file     Social Determinants of Health     Financial Resource Strain: Not on file   Food Insecurity: No Food Insecurity (2023)    Hunger Vital Sign    • Worried About Running Out of Food in the Last Year: Never true    • Ran Out of Food in the Last Year: Never true   Transportation Needs: No Transportation Needs (8/16/2023)    PRAPARE - Transportation    • Lack of Transportation (Medical): No    • Lack of Transportation (Non-Medical): No   Physical Activity: Not on file   Stress: Not on file   Social Connections: Not on file   Intimate Partner Violence: Not on file   Housing Stability: Low Risk  (8/16/2023)    Housing Stability Vital Sign    • Unable to Pay for Housing in the Last Year: No    • Number of Places Lived in the Last Year: 2    • Unstable Housing in the Last Year: No     History reviewed. No pertinent family history. MEDICATIONS / ALLERGIES:  all current active meds have been reviewed    Allergies   Allergen Reactions   • Levofloxacin Nausea Only   • Doxycycline GI Intolerance   • Penicillins GI Intolerance       OBJECTIVE:  /82 (BP Location: Right arm)   Pulse 95   Temp (!) 97.2 °F (36.2 °C) (Temporal)   Resp 18   Ht 5' 9" (1.753 m)   Wt 85.2 kg (187 lb 12.8 oz)   SpO2 95%   BMI 27.73 kg/m²   Physical Exam:  Constitutional: Appears well-developed and well-nourished. Appears chronically ill looking but not toxic appearing. Appears tired. Comfortable. Tearful. In no acute physical or emotional distress. Head: Normocephalic and atraumatic. Eyes: EOM are normal. No ocular discharge. No scleral icterus. Neck: No visible adenopathy or masses. Respiratory: Effort normal. No stridor. No respiratory distress. On 4L NC  Gastrointestinal: No abdominal distension. Musculoskeletal: No edema. Neurological: Alert, oriented and appropriately conversant. Skin: Dry, no diaphoresis. Psychiatric: Displays a normal mood and affect. Behavior, judgment and thought content appear normal.     Lab Results: I have personally reviewed pertinent labs. Imaging Studies: I have personally reviewed pertinent reports. EKG, Pathology, and Other Studies: I have personally reviewed pertinent reports. Counseling / Coordination of Care:  Counseling / Coordination of Care  Total floor / unit time spent today 45 minutes. Greater than 50% of total time was spent with the patient and / or family counseling and / or coordination of care. A description of the counseling / coordination of care: provided medical updates, discussed palliative care, discussed hospice care, determined competency, determined goals of care, determined POA, determined social/family support, discussed plans of care, discussed symptom management, provided psychosocial support.      Jose Manuel Oconnell MD  [facility] [facility] [address] [facility] and Supportive Care  [phone]

## 2023-08-17 NOTE — PLAN OF CARE
Problem: PHYSICAL THERAPY ADULT  Goal: Performs mobility at highest level of function for planned discharge setting. See evaluation for individualized goals. Description: Treatment/Interventions: LE strengthening/ROM, Functional transfer training, Elevations, Therapeutic exercise, Endurance training, Patient/family training, Equipment eval/education, Bed mobility, Gait training, OT, Spoke to nursing, Spoke to advanced practitioner  Equipment Recommended: Renetta Bah       See flowsheet documentation for full assessment, interventions and recommendations. 8/17/2023 1552 by Osvaldo Maloney  Note: Prognosis: Fair  Problem List: Decreased strength, Decreased endurance, Impaired balance, Decreased mobility, Pain  Assessment: Marc Huang is a 77 y.o. male admitted to [address] [place] [address] on 8/14/2023 for Pneumonia. PT was consulted and pt was seen on 8/17/2023 for mobility assessment and d/c planning. Pt presents with increased risk for falls and multiple lines. Pt is currently functioning at a minimum assistance x1 level for bed mobility, supervision assistance x1 level for transfers, and supervision assistance x1 level for ambulation with Rolling Walker. Pt demonstrated decreased endurance, reliance on DME and activity intolerance upon initial evaluation. Required min Ax1 for assist with trunk in supine to sit. Able to preform transfers and ambulation with minimal cuing. Pt ambulation limited by DEMARCO, SpO2 remains 97% on RA pre and post session. Pt able to complete sit to supine with supervision A and no physical assist after seated rest. At baseline, pt was independent with all functional mobility without AD. Pt will benefit from continued skilled IP PT to address the above mentioned impairments  in order to maximize recovery and increase functional independence when completing mobility and ADLs. Currently PT recommendations for DME include RW. At this time PT recommendations for d/c are home with HHPT.   Barriers to Discharge: None PT Discharge Recommendation: Home with home health rehabilitation (may need to consider STR pending son ability to cont to care for patient at home)    See flowsheet documentation for full assessment.

## 2023-08-17 NOTE — PHYSICAL THERAPY NOTE
PHYSICAL THERAPY EVALUATION          Patient Name: Irene Tiwari  RQCXB'C Date: 8/17/2023 08/17/23 1208   Note Type   Note type Evaluation   Pain Assessment   Pain Assessment Tool 0-10   Pain Score 5   Pain Location/Orientation Location: [facility]   Hospital Pain Intervention(s) Repositioned; Ambulation/increased activity; Emotional support   Restrictions/Precautions   Other Precautions Multiple lines; Fall Risk;Bed Alarm; Chair Alarm;Immunosuppressed; Aspiration   Home Living   Type of Home Other (Comment)  (trailer)   Home Layout One level;Stairs to enter with rails  (3-4 PAULINE)   Bathroom Shower/Tub Tub/shower unit   DEO Becerra Other (Comment)  (denies)   Prior Function   Level of [place] Needs assistance with IADLS; Independent with functional mobility; Needs assistance with ADLs   Lives With Son  (home, able to assist per patient)   Receives Help From Family   IADLs Independent with driving; Independent with meal prep; Independent with medication management   Falls in the last 6 months 1 to 4  (1)   Vocational On disability   Comments recent A for ADLs   General   Additional Pertinent History Admitted 8/14/23 for pneumonia. Up and OOB orders. PMH sig for brain CA, CKD,   Cognition   Overall Cognitive Status WFL   Arousal/Participation Cooperative   Orientation Level Oriented X4   Memory Within functional limits   Following Commands Follows all commands and directions without difficulty   Subjective   Subjective "Everyone tells me my numbers look good, but my breathing doesn't feel good."   RLE Assessment   RLE Assessment WFL   LLE Assessment   LLE Assessment WFL   Bed Mobility   Supine to Sit 4  Minimal assistance   Additional items Assist x 1; Increased time required; Bedrails;Verbal cues;HOB elevated  (assist for trunk, VCs for sequencing)   Sit to Supine 5  Supervision   Additional items Increased time required; Bedrails   Transfers Sit to Stand 5  Supervision   Additional items Bedrails; Increased time required; Other  (RW)   Stand to Sit 5  Supervision   Additional items Bedrails; Increased time required; Other  (RW)   Ambulation/Elevation   Gait pattern Forward Flexion; Excessively slow;Decreased foot clearance; Short stride   Gait Assistance 5  Supervision   Additional items Verbal cues  (cues for pacing)   Assistive Device Rolling walker   Distance 40'   Balance   Static Sitting Fair +   Dynamic Sitting Fair   Static Standing Fair   Dynamic Standing Fair   Ambulatory Fair -   Endurance Deficit   Endurance Deficit Yes   Endurance Deficit Description DEMARCO. /76 seated EOB. SpO2 97% on RA pre and post mobilization. Activity Tolerance   Activity Tolerance Patient limited by fatigue   Medical Staff Made Aware Tamiko PT   Nurse Made Aware Eloise RN   Assessment   Prognosis Fair   Problem List Decreased strength;Decreased endurance; Impaired balance;Decreased mobility;Pain   Assessment Merrill Matthew is a 77 y.o. male admitted to [address] [place] [address] on 8/14/2023 for Pneumonia. PT was consulted and pt was seen on 8/17/2023 for mobility assessment and d/c planning. Pt presents with increased risk for falls and multiple lines. Pt is currently functioning at a minimum assistance x1 level for bed mobility, supervision assistance x1 level for transfers, and supervision assistance x1 level for ambulation with Rolling Walker. Pt demonstrated decreased endurance, reliance on DME and activity intolerance upon initial evaluation. Required min Ax1 for assist with trunk in supine to sit. Able to preform transfers and ambulation with minimal cuing. Pt ambulation limited by DEMARCO, SpO2 remains 97% on RA pre and post session. Pt able to complete sit to supine with supervision A and no physical assist after seated rest. At baseline, pt was independent with all functional mobility without AD.   Pt will benefit from continued skilled IP PT to address the above mentioned impairments in order to maximize recovery and increase functional independence when completing mobility and ADLs. Currently PT recommendations for DME include RW. At this time PT recommendations for d/c are home with HHPT. Barriers to Discharge None   Goals   Patient Goals to get better   STG Expiration Date 08/31/23   Short Term Goal #1 (1) Pt will perform bed mobility with mod I demonstrating appropriate form 100% of the time to promote improved functional mobility. (2) Perform all transfers with mod I and appropriate technique 100% of the time to promote ability to safely negotiate home environment. (3) Ambulate at least 250' with LRAD and mod I to promote safe negotiation of home environment. (4) Improve overall strength and balance 1/2 grade to optimize ability to perform functional mobility, ADLs, IADLs and demonstrate reduced fall risk. (5) Increase activity tolerance to 30 minutes to promote endurance for completion of functional tasks. (6) Negotiate 4 stairs with most appropriate technique and supervision to promote safe mobility in home environment. (7) Continue PT for ongoing pt/family education, DME needs, and d/c planning to promote highest level of function in least restricted environment. Plan   Treatment/Interventions LE strengthening/ROM; Functional transfer training;Elevations; Therapeutic exercise; Endurance training;Patient/family training;Equipment eval/education; Bed mobility;Gait training;OT;Spoke to nursing;Spoke to advanced practitioner   PT Frequency 2-3x/wk   Recommendation   PT Discharge Recommendation Home with home health rehabilitation  (may need to consider STR pending son ability to cont to care for patient at home)   Equipment Recommended [address] walker   Change/add to [employer]?  No   AM-PAC Basic Mobility Inpatient   Turning in Flat Bed Without Bedrails 3   Lying on Back to Sitting on Edge of Flat Bed Without Bedrails 3   Moving Bed to Chair 3 Standing Up From Chair Using Arms 3   Walk in Room 3   Climb 3-5 Stairs With Railing 3   Basic Mobility Inpatient Raw Score 18  The patient's AM-PAC Basic Mobility Inpatient Short Form Raw Score is 18. A Raw score of greater than or equal to 16 suggests the patient may benefit from discharge to home. Please also refer to the recommendation of the Physical Therapist for safe discharge planning. Basic Mobility Standardized Score 41.05   Highest Level Of Mobility   JH-HLM Goal 6: Walk 10 steps or more   JH-HLM Achieved 7: Walk 25 feet or more   End of Consult   Patient Position at End of Consult Supine;Bed/Chair alarm activated; All needs within reach

## 2023-08-17 NOTE — ASSESSMENT & PLAN NOTE
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   Follows with [facility] oncology  Continue pain management and add MiraLAX and Senokot for constipation.     Patient is contemplating hospice/palliative care but would like to continue current therapy at this time  No recommendations for G-CSF at this time per oncology  Oncology coordinating with [facility] oncology while patient is hospitalized   Patient will ultimately require outpatient follow-up with his [facility] providers to discuss further options - seen by palliative here

## 2023-08-17 NOTE — PROGRESS NOTES
Medical Oncology/Hematology Progress Note  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 He is currently being followed by Dr. Carissa Flowers at [facility] oncology. His last treatment was on 8/9/23 (leucovorin, oxaliplatin, fluorouracil [5-FU]). with pump disconnect on Friday, 8/11/23. After pump disconnect, patient was emergently taken to [facility] ED for shortness of breath, but was discharged shortly thereafter. Patient uncertain if he received a growth factor s/p treatment. He presented to the ED on 8/14/23 with dyspnea. He reported having ongoing dyspnea on exertion and was evaluated at [facility] for possible pneumonitis. He did have a history of pneumonitis that was immunotherapy-induced (nivolumab) on 7/31/2023. Imaging showed infiltrate seen on chest x-ray. He was started on ceftriaxone and azithromycin. On examination, patient was resting comfortably, but continues with mild chest tightness. From past discussion about his treatment, he is considering    ceasing all chemotherapy treatments in the future. He was encouraged to discuss further with his primary oncologist at [facility]. Hematology was consulted regarding his neutropenia. WBC improved to 2.69 from 1.44. ANC at 1.72. Assessment and Plan:  1. Malignant neoplasm of lower third of esophagus      Malignant neoplasm metastatic to brain   -followed by [facility] Oncology    Pancytopenia  2. Neutropenia  -Evident since 7/2023  -WBC 2.69 <2.45   - ANC 1.72. Afebrile. -s/p chemotherapy on 8/8/23 (oxalipaltin and 5-FU). 5-FU disconnect on 8/11/23. 3. Acute anemia  -Evident since 7/2023. ,  MCHC 34.1   -macrocytic, normochromic  -Hemoglobin trend: 7.3 (8/17) < 7.9 < 7.6    4. Thrombocytopenia  Platelet trend: 83 (8/37)<474 < 115  -No signs of bleeding.  Presence of diffuse bruising on upper extremities    PLAN:  -Follow up phone call to Dr. Ramírez Trejo, [facility] Hematology-Oncology. Left message with Merlin Side; offered my personal cell phone number. ADDENDUM: spoke with Dr. Ramírez Trejo, recommeding pulmonology consult d/t fibrosis  Asking us to consider steroids 60 mg daily.     -Patient is afebrile with [place]berg of 1.72; need update ANC from lab. He continues with his antibiotics with no respiratory symptoms; on room air.     -Will add one dose of 480 mcg of G-CSF at this time. WBC improving, however, in light on possible pneumonia, will add growth factor for adjunct support.    -Appreciate assistance from palliative care in starting conversations about goals of care. While he may not be ready for hospice, he would like to discuss further with dr. Ramírez Trejo with the possibility of ceasing cancer treatments. Outpatient follow up plan: will follow up with Dr. Emma Arreguin at [address]    Please do not hesitate to reach out for questions or concerns. Angel Swift, CASANDRA, CRNP  Hematology-Oncology       History of present illness:  Ramez Del Cid is a 77 y.o. 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 He is currently being followed by Dr. Ramírez Trejo at [facility] oncology. His last treatment was on 8/9/23 (leucovorin, oxaliplatin, fluorouracil [5-FU]). with pump disconnect on Friday, 8/11/23. After pump disconnect, patient was emergently taken to [facility] ED for shortness of breath, but was discharged shortly thereafter. Patient uncertain if he received a growth factor s/p treatment. He presented to the ED on 8/14/23 with dyspnea. He reported having ongoing dyspnea on exertion and was evaluated at [facility] for possible pneumonitis. He did have a history of pneumonitis that was immunotherapy-induced (nivolumab) on 7/31/2023. Imaging showed infiltrate seen on chest x-ray. He was started on ceftriaxone and azithromycin. Sputum culture pending.   On examination, patient was resting comfortably with complaints of chest tightness whenever he takes deep breaths, possibly pleuritic chest pain rating it at 6 out of a 10. He also complains of constipation, but has been eating well. He reported consuming all of his breakfast this morning. Patient expressed that he is considering ceasing all chemotherapy treatments in the future as he " is exhausted from multiple hospitalizations."  Discussed general guidelines about comfort directed measures/hospice. He was encouraged to discuss further with his primary oncologist at [facility]. Review of Systems:   Review of Systems   Constitutional: Positive for activity change and fatigue. Negative for chills and fever. HENT: Negative for ear pain and sore throat. Eyes: Negative for pain and visual disturbance. Respiratory: Positive for chest tightness. Negative for cough and shortness of breath. Cardiovascular: Negative for chest pain and palpitations. Gastrointestinal: Negative for abdominal pain and vomiting. Genitourinary: Negative for dysuria and hematuria. Musculoskeletal: Negative for arthralgias and back pain. Skin: Negative for color change and rash. Neurological: Negative for seizures and syncope. Psychiatric/Behavioral: The patient is nervous/anxious. All other systems reviewed and are negative. Oncology History:   Cancer Staging   No matching staging information was found for the patient. Oncology History    No history exists. Past Medical History:   Past Medical History:   Diagnosis Date   • Cancer ([address])     esophageal   • DVT (deep venous thrombosis) ([address])        History reviewed. No pertinent surgical history. History reviewed. No pertinent family history. Social History     Socioeconomic History   • Marital status:       Spouse name: None   • Number of children: None   • Years of education: None   • Highest education level: None   Occupational History   • None   Tobacco Use   • Smoking status: Former     Types: Cigarettes   • Smokeless tobacco: None   Vaping Use   • Vaping Use: None   Substance and Sexual Activity   • Alcohol use: Not Currently   • Drug use: Never   • Sexual activity: Not Currently   Other Topics Concern   • None   Social History Narrative   • None     Social Determinants of Health     Financial Resource Strain: Not on file   Food Insecurity: No Food Insecurity (8/16/2023)    Hunger Vital Sign    • Worried About Running Out of Food in the Last Year: Never true    • Ran Out of Food in the Last Year: Never true   Transportation Needs: No Transportation Needs (8/16/2023)    PRAPARE - Transportation    • Lack of Transportation (Medical): No    • Lack of Transportation (Non-Medical):  No   Physical Activity: Not on file   Stress: Not on file   Social Connections: Not on file   Intimate Partner Violence: Not on file   Housing Stability: Low Risk  (8/16/2023)    Housing Stability Vital Sign    • Unable to Pay for Housing in the Last Year: No    • Number of Places Lived in the Last Year: 2    • Unstable Housing in the Last Year: No         Current Facility-Administered Medications:   •  acetaminophen (TYLENOL) tablet 650 mg, 650 mg, Oral, Q6H PRN, Aparna Springer DO, 650 mg at 08/17/23 0636  •  albuterol (PROVENTIL HFA,VENTOLIN HFA) inhaler 2 puff, 2 puff, Inhalation, Q6H PRN, Aparna Springer DO  •  aluminum-magnesium hydroxide-simethicone (MAALOX) oral suspension 30 mL, 30 mL, Oral, Q6H PRN, Aparna Springer DO  •  apixaban (ELIQUIS) tablet 5 mg, 5 mg, Oral, BID, Colton Brown DO, 5 mg at 08/17/23 0944  •  calcium carbonate (TUMS) chewable tablet 1,000 mg, 1,000 mg, Oral, Daily PRN, Aparna Springer DO  •  cholecalciferol (VITAMIN D3) tablet 1,000 Units, 1,000 Units, Oral, Daily, Aparna Springer DO, 1,000 Units at 08/17/23 0944  •  Filgrastim-aafi (NIVESTYM) subcutaneous injection 480 mcg, 480 mcg, Subcutaneous, Daily, Orval Khurram, CRNP  •  finasteride (PROSCAR) tablet 5 mg, 5 mg, Oral, Daily, Colton Brown DO, 5 mg at 08/17/23 0944  •  levalbuterol (XOPENEX) inhalation solution 1.25 mg, 1.25 mg, Nebulization, Q6H PRN, Kelsi Sierra PA-C  •  levofloxacin (LEVAQUIN) tablet 750 mg, 750 mg, Oral, Q24H, Kelsi Sierra PA-C  •  lidocaine (LIDODERM) 5 % patch 1 patch, 1 patch, Topical, Daily, Boy Mariee PA-C, 1 patch at 08/17/23 0944  •  LORazepam (ATIVAN) injection 0.5 mg, 0.5 mg, Intravenous, Q6H PRN, Husam Hess PA-C, 0.5 mg at 08/17/23 1530  •  melatonin tablet 10.5 mg, 10.5 mg, Oral, HS, Colton Pratt Crooks, DO, 10.5 mg at 08/16/23 2104  •  metoprolol succinate (TOPROL-XL) 24 hr tablet 12.5 mg, 12.5 mg, Oral, BID, Devon Edouard DO, 12.5 mg at 08/17/23 0944  •  ondansetron (ZOFRAN) injection 4 mg, 4 mg, Intravenous, Q6H PRN, Devon Edouard DO, 4 mg at 08/14/23 1936  •  pantoprazole (PROTONIX) EC tablet 40 mg, 40 mg, Oral, Daily Before Breakfast, Colton Salid Ivins, DO, 40 mg at 08/17/23 0636  •  polyethylene glycol (MIRALAX) packet 17 g, 17 g, Oral, Daily, Kelsi Sierra PA-C, 17 g at 08/17/23 6508  •  predniSONE tablet 5 mg, 5 mg, Oral, Daily, Colton Santa Ivins, DO, 5 mg at 08/17/23 0944  •  senna (SENOKOT) tablet 8.6 mg, 1 tablet, Oral, HS, JERO Hsu, 8.6 mg at 08/16/23 2105  •  simethicone (MYLICON) chewable tablet 80 mg, 80 mg, Oral, 4x Daily PRN, Devon Edouard DO  •  sodium chloride 0.9 % infusion, 50 mL/hr, Intravenous, Continuous, Kelsi Sierra PA-C, Last Rate: 50 mL/hr at 08/17/23 1531, 50 mL/hr at 08/17/23 1531  •  umeclidinium-vilanterol 62.5-25 mcg/actuation inhaler 1 puff, 1 puff, Inhalation, Daily, Devon Edouard, DO, 1 puff at 08/17/23 0946    Medications Prior to Admission   Medication   • albuterol (PROVENTIL HFA,VENTOLIN HFA) 90 mcg/act inhaler   • apixaban (ELIQUIS) 5 mg   • aspirin (ECOTRIN) 325 mg EC tablet   • Cholecalciferol 50 MCG (2000 UT) CAPS   • finasteride (PROSCAR) 5 mg tablet   • melatonin 3 mg   • metoprolol succinate (TOPROL-XL) 25 mg 24 hr tablet   • pantoprazole (PROTONIX) 40 mg tablet   • predniSONE 10 mg tablet   • senna (SENOKOT) 8.6 MG tablet   • umeclidinium-vilanterol 62.5-25 mcg/actuation inhaler       Allergies   Allergen Reactions   • Levofloxacin Nausea Only   • Doxycycline GI Intolerance   • Penicillins GI Intolerance         Physical Exam:     /86 (BP Location: Right arm)   Pulse 98   Temp (!) 97.1 °F (36.2 °C) (Temporal)   Resp 18   Ht 5' 9" (1.753 m)   Wt 85.2 kg (187 lb 12.8 oz)   SpO2 96%   BMI 27.73 kg/m²     Physical Exam  HENT:      Head: Normocephalic. Mouth/Throat:      Mouth: Mucous membranes are moist.   Eyes:      Conjunctiva/sclera: Conjunctivae normal.   Cardiovascular:      Rate and Rhythm: Normal rate and regular rhythm. Pulmonary:      Effort: Pulmonary effort is normal.      Comments: Chest pain upon taking deep breaths  Abdominal:      Palpations: Abdomen is soft. Skin:     General: Skin is warm and dry. Findings: Bruising present. Neurological:      General: No focal deficit present. Mental Status: He is alert and oriented to person, place, and time. Motor: Weakness present. Psychiatric:         Behavior: Behavior normal.         Thought Content:  Thought content normal.      Comments: tearful           Recent Results (from the past 48 hour(s))   CBC    Collection Time: 08/16/23  6:36 AM   Result Value Ref Range    WBC 2.69 (L) 4.31 - 10.16 Thousand/uL    RBC 2.11 (L) 3.88 - 5.62 Million/uL    Hemoglobin 7.3 (L) 12.0 - 17.0 g/dL    Hematocrit 22.2 (L) 36.5 - 49.3 %     (H) 82 - 98 fL    MCH 34.6 (H) 26.8 - 34.3 pg    MCHC 32.9 31.4 - 37.4 g/dL    RDW 22.7 (H) 11.6 - 15.1 %    Platelets 83 (L) 128 - 390 Thousands/uL    MPV 8.9 8.9 - 12.7 fL   Basic metabolic panel    Collection Time: 08/16/23  6:36 AM   Result Value Ref Range    Sodium 140 135 - 147 mmol/L    Potassium 3.6 3.5 - 5.3 mmol/L    Chloride 108 96 - 108 mmol/L    CO2 26 21 - 32 mmol/L    ANION GAP 6 mmol/L    BUN 23 5 - 25 mg/dL Creatinine 1.25 0.60 - 1.30 mg/dL    Glucose 88 65 - 140 mg/dL    Calcium 7.7 (L) 8.4 - 10.2 mg/dL    eGFR 57 ml/min/1.73sq m   Procalcitonin    Collection Time: 08/16/23  6:36 AM   Result Value Ref Range    Procalcitonin 0.39 (H) <=0.25 ng/ml   ECG 12 lead    Collection Time: 08/16/23  2:53 PM   Result Value Ref Range    Ventricular Rate 93 BPM    Atrial Rate 93 BPM    CA Interval 112 ms    QRSD Interval 82 ms    QT Interval 342 ms    QTC Interval 425 ms    P Axis 5 degrees    QRS Axis 44 degrees    T Wave Axis 64 degrees       VAS upper limb venous duplex scan, unilateral/limited    Result Date: 8/15/2023  Narrative:  [facility] REPORT CLINICAL: Indications: Patient presents with right upper extremity edema. Operative History: Denies Any Cardiovascular Surgeries Risk Factors The patient has history of Malignant Neoplasm of the lower 1/3rd Esophagus with Metastatic to Brain,  Hyperlipidemia and previous smoking (quit 5-10yrs ago). CONCLUSION:  Impression RIGHT UPPER LIMB: No evidence of acute or chronic deep vein thrombosis. No evidence of superficial thrombophlebitis noted. Doppler evaluation shows a normal response to augmentation maneuvers. LEFT UPPER LIMB LIMITED: Evaluation shows no evidence of thrombus in the internal jugular vein, subclavian vein, and the innominate vein. SIGNATURE: Electronically Signed by: Abigail Brock MD, [facility] on 2023-08-15 10:45:25 AM    CT chest wo contrast    Result Date: 8/14/2023  Narrative: CT CHEST WITHOUT IV CONTRAST INDICATION:   cancer patient, shortness of breath; r/p pneumonia? cancer patient, shortness of breath. Per my review of the medical record, esophageal cancer with brain metastases. Chemotherapy last Wednesday. Shortness of breath for 2 weeks. Evaluated at [facility] on 8/11/2023 with CT negative for pulmonary embolus. Feels his throat is closing. COMPARISON: Chest radiograph and neck CT from today.  TECHNIQUE: Chest CT without intravenous contrast. Axial, sagittal, coronal 2D reformats and coronal MIPS from source data. Radiation dose length product (DLP):  382 mGy-cm . Radiation dose exposure minimized using iterative reconstruction and automated exposure control. FINDINGS: LUNGS: Emphysema. No definite acute disease. Mild basilar predominant juxtapleural reticulation with traction bronchiolectasis suggestive of mild fibrosis. Mild groundglass opacity in both lungs with a linear appearance on coronal series, likely atelectasis and mild fibrosis. Small cluster of tree-in-bud nodules in the lateral right upper lobe. AIRWAYS: No significant filling defects. PLEURA:  Unremarkable. HEART/GREAT VESSELS: Normal heart size. Mild coronary artery calcification indicating atherosclerotic heart disease. Right port at cavoatrial junction. MEDIASTINUM AND NELL: No visible esophageal malignancy. CHEST WALL AND LOWER NECK: Unremarkable. UPPER ABDOMEN: Diffuse hepatic steatosis. OSSEOUS STRUCTURES: Mild degenerative disease in the spine. Impression: No definite acute disease. Small cluster of tree-in-bud nodules in the lateral right upper lobe, likely minimal bronchiolitis. Mild lower lobe basilar predominant reticulation and traction bronchiolectasis suggestive of mild fibrosis. No visible esophageal malignancy. Workstation performed: [id number]     CT soft tissue neck    Result Date: 8/14/2023  Narrative: CT NECK WITH CONTRAST INDICATION:   SOB - hx esophageal CA - r/o increased tumor burden/airway. Metastatic disease to the brain. Chemotherapy last Wednesday. History of malignant neoplasm involving the lower third of the esophagus. COMPARISON: Correlation made to outside CT report from dating back to 2/6/2023 TECHNIQUE:  Axial, sagittal, and coronal 2D reformatted images were created from the axial source data and submitted for interpretation. Radiation dose length product (DLP) for this visit:  928 mGy-cm .   This examination, like all CT scans performed in the [facility]. [address], was performed utilizing techniques to minimize radiation dose exposure, including the use of iterative reconstruction and automated exposure control. IV Contrast:  85 mL of iohexol (OMNIPAQUE) IMAGE QUALITY:  Diagnostic. FINDINGS: VISUALIZED BRAIN PARENCHYMA: Correlate with outside MRI for findings related to known brain metastases. VISUALIZED ORBITS: Normal visualized orbits. PARANASAL SINUSES: Normal visualized paranasal sinuses. NASAL CAVITY AND NASOPHARYNX:  Normal. SUPRAHYOID NECK:  Normal oral cavity, tongue base, tonsillar fossa and epiglottis. INFRAHYOID NECK:  Aryepiglottic folds and piriform sinuses are normal.  Normal glottis and subglottic airway. THYROID GLAND:  Unremarkable. PAROTID AND SUBMANDIBULAR GLANDS:  Normal. LYMPH NODES:  No pathologic or enlarged adenopathy. VASCULAR STRUCTURES:  Normal enhancement of the cervical vasculature. THORACIC INLET: There is emphysema. Patient has a reported known history of lower esophageal cancer, not imaged on the current examination. The visualized esophagus is under distended. Right-sided Mediport partially imaged. BONY STRUCTURES: There is cervical spondylosis. No discrete lytic or blastic osseous lesions are identified. Impression: No nodular enhancing lesions identified along the course of the aerodigestive tract. No discrete edema or collection identified. Correlate with outside prior exams for findings related to the patient's known clinical history of distal esophageal neoplasm. Workstation performed: [id number]     XR chest 1 view portable    Result Date: 8/14/2023  Narrative: CHEST INDICATION:   SOB. COMPARISON:  None EXAM PERFORMED/VIEWS:  XR CHEST PORTABLE Single view FINDINGS: Right IJ Mediport terminating in the SVC just above the cavoatrial junction Cardiomediastinal silhouette appears unremarkable.  Faint groundglass opacity at right lung base suspicious for early infiltrate versus atelectasis No pneumothorax or pleural effusion. Osseous structures appear within normal limits for patient age. Impression: Faint groundglass opacity at right lung base suspicious for early infiltrate versus atelectasis Workstation performed: [id number]     CT CHEST PULMONARY EMBOLISM W CONTRAST    Result Date: 8/11/2023  Narrative: History: Shortness of breath.   Exam: CT of the chest with IV contrast (PE protocol)   Technique: Axial CT of the chest performed with 90 cc of Omnipaque 350 intravenous contrast with particular attention to the pulmonary arteries. Coronal reformations were performed as well.   Comparison: CT chest dated 7/29/2023   Findings: Pulmonary Arteries: There is adequate opacification of the pulmonary arteries. No filling defect within the pulmonary arteries to indicate pulmonary embolism. Lungs, pleura: Central airways are patent. Emphysematous changes most prominent within the bilateral lung apices. Dependent atelectasis. There is no focal consolidation. No pneumothorax is seen. No pleural effusion is noted. Linear atelectasis versus scarring within the lung bases. Cardiovascular, mediastinum, thyroid: The heart size is normal. No pericardial effusion is seen. There is atherosclerotic calcification of the coronary arteries and thoracic aorta. There is mild atherosclerotic calcification of the coronary arteries. The visualized thyroid gland appears grossly normal. Suggestion of wall thickening within the mid to distal esophagus compatible with esophageal reflux/GERD.   Lymph nodes: No thoracic lymphadenopathy is seen. Alex, chest wall: The visualized osseous structures appear grossly normal. No suspicious osseous lesions are seen.  Upper abdomen: Unremarkable, including the adrenal glands.       Impression: Impression:   No evidence of pulmonary embolism or other acute abnormality in the chest. Other findings as above.   Workstation:[id number]    XR chest pa & lateral    Result Date: 8/11/2023  Narrative: History: Shortness of breath . Technique: PA and lateral views of the chest. Comparisons: Comparison is made to prior radiographs performed on 7/31/2023. Findings: Distal tip of right IJ Port-A-Cath at the level of the superior vena cava. The cardiac silhouette is not enlarged. The mediastinum is not widened. No pneumothorax is visualized. No pneumomediastinum is visualized. No pleural effusion is visualized. No evidence of pneumonia. No acute fracture or dislocation is visualized. Impression: Impression: No acute pathology visualized. Workstation:[id number]    MRI BRAIN WWO CONTRAST    Result Date: 8/4/2023  Narrative: MRI brain with and without contrast CLINICAL INFORMATION: Brain metastases. TECHNIQUE: Multiplanar, multisequence MRI brain was performed before and after contrast administration. 17.5 mL of DOTAREM was administered. COMPARISON: 05/02/2023 and prior examinations. FINDINGS: Significant motion artifacts present. Brain parenchyma: Right frontal lesion measuring 3 mm (series 9 image 73) and left cerebellar lesion measuring 3 mm (series 9 image 16) again identified. Enhancing lesion measuring 4 mm (series 9 image 20) in the left cerebellum, new. The other lesions are not clearly identified. Nonspecific T2/FLAIR hyperintensities again identified in the cerebral white matter and brainstem. No acute infarct. Ventricles: Normal. Extra-axial spaces: Normal. Intracranial hemorrhage: No acute intracranial hemorrhage. Midline shift: None. Calvarium and skull base: Normal. Orbits: Normal. Paranasal sinuses: Clear. Mastoids: Small amount of fluid in the mastoid air cells. Sella: Normal.    Impression: IMPRESSION: 1.  Significant motion artifacts present. 2.  Enhancing lesion in the left cerebellum, new from the prior examination. 3.  Right frontal and left cerebellar lesions again identified. The other lesions are not clearly identified. [id number]    ECHO 2D COMPLETE    Result Date: 8/2/2023  Narrative:  This result has an attachment that is not available. Technically difficult study. Left ventricle is normal in size. Mild basal septal hypertrophy. Systolic function is normal with an ejection fraction of 60-65%. Wall motion is within normal limits. There is grade I (mild) diastolic dysfunction and   normal left atrial pressure Right ventricle was not well visualized. Right ventricle cavity is top normal. Systolic function is normal. Mild mitral regurgitation. Left Ventricle Left ventricle is normal in size. Mild basal septal hypertrophy. Systolic function is normal with an ejection fraction of 60-65%. Wall motion is within normal limits. There is grade I (mild) diastolic dysfunction and normal left atrial pressure. Right Ventricle Right ventricle was not well visualized. Right ventricle cavity is top normal. Systolic function is normal. Left Atrium Left atrium cavity size is normal. Right Atrium Right atrium cavity is normal. IVC/SVC The inferior vena cava demonstrates a diameter of <=21 mm and collapses >50%; therefore, the right atrial pressure is estimated at 0-5 mmHg. Mitral Valve The leaflets are mildly thickened. There is mild regurgitation. There is no evidence of mitral valve stenosis. Tricuspid Valve Tricuspid valve structure is normal. There is trace regurgitation. There is no evidence of tricuspid valve stenosis. Aortic Valve The aortic valve is not well-visualized to determine number of cusps. There is no regurgitation or stenosis. Pulmonic Valve Pulmonic valve structure is normal. There is no regurgitation or stenosis. Ascending Aorta The aorta appears top normal in size. Pericardium There is no pericardial effusion. The pericardium has a fat pad. Study Details A complete 2D echocardiogram was performed. Color flow, Pulse Wave and Continuous Wave Doppler was performed and analyzed. VAS VENOUS DUPLEX LOWER EXTREMITY BILATERAL COMPLETE    Result Date: 8/1/2023  Narrative: History: Shortness of breath. Evaluate for DVT. Real-time B-mode ultrasound, spectral Doppler analysis and color Doppler examination of both lower extremities were performed. Both common femoral veins, femoral veins, popliteal veins and visualized left calf veins are normal in caliber and compressibility. These veins demonstrate phasic venous waveforms, with normal blood flow on color Doppler examination. Within the right calf, the posterior tibial veins are patent and compressible. The peroneal veins are dilated and noncompressible and contains segments of occlusive and nonocclusive thrombus. An intramuscular soleal vein is dilated and noncompressible, and contains segments of occlusive and nonocclusive thrombus. Impression: Impression: 1. There is acute right calf DVT within the peroneal veins and an intramuscular soleal vein. These veins contain segments of occlusive and nonocclusive thrombus. There is no extension of thrombus into the right popliteal vein. 2. No evidence of DVT within the left femoral-popliteal system. Workstation:[id number]    CT renal stone study abdomen pelvis wo contrast    Result Date: 7/31/2023  Narrative: History: Diarrhea and sepsis Exam: Nonenhanced CT of the abdomen and pelvis. 7/31/2023   Technique: Using helical technique, axial images were obtained through the abdomen and pelvis. Coronal and sagittal reformations were performed.   Comparison: CT chest, abdomen and pelvis 5/5/2023     Abdomen: Lung Bases: Minor atelectasis or scarring is evident at the lung bases Liver: Normal Gallbladder/Bile ducts: Normal. Spleen: Spleen not enlarged. Pancreas: The pancreas is partially fatty replaced. No pancreatic mass, edema or peripancreatic collection can be detected. Adrenal glands: Normal. Kidneys/Ureters: The right kidney contains a 1 cm high attenuation lesion in the lower pole which is most likely a hemorrhagic cyst. No retained calculi or hydronephrosis are evident involving either kidney.  Bowel/Mesentery: No free intraperitoneal air is evident. A small hiatal hernia is noted. No bowel obstruction is evident. The appendix is visualized and appears normal. Lymph nodes: Normal. Vessels: No abdominal aortic aneurysm or retroperitoneal hematoma is evident. Abdominal Wall: Normal. Pelvis: No pelvic mass or free fluid is evident. Urinary Bladder: Normal. Lymph nodes:  Normal.   Bones: No destructive bony lesions are evident.      Impression: Impression: No bowel obstruction or free air. Workstation:[id number]    XR chest pa & lateral    Result Date: 7/31/2023  Narrative: AP and lateral views of chest compared with examination on 7/29/2023 HISTORY: There is a right Mediport catheter with tip in the region of superior vena cava. The heart is of normal size. There is no widening of mediastinum. There appears mild infiltrate and/or atelectasis in bilateral lung bases. There are no gross pleural effusions. There is no pneumothorax. Impression: IMPRESSION: Mild infiltrate and/or atelectasis in lung bases, slightly increased. Workstation:[id number]    CT CHEST PULMONARY EMBOLISM W CONTRAST    Result Date: 7/29/2023  Narrative: History: Shortness of breath.   Exam: CT of the chest with IV contrast (PE protocol)   Technique: Axial CT of the chest performed with 75 cc of Omnipaque 350 intravenous contrast with particular attention to the pulmonary arteries. Coronal reformations were performed as well.   Comparison: CT chest 5/14/2023   Findings: Pulmonary Arteries: Normal.   Lungs/Pleura: Patchy peripheral and basilar predominant consolidative and groundglass opacity seen previously has mostly resolved, with probable mild residual scarring. Mild upper lung emphysema. No pneumothorax or pleural effusion Mediastinum/Lymph nodes/Heart: No thoracic adenopathy or pericardial effusion. No acute aortic pathology.  Port catheter tip in the SVC Chest Wall: Normal.   Upper abdomen/Other: Hepatic steatosis Bones: No acute osseous abnormality.      Impression: Impression:  No evidence of pulmonary embolism or other acute abnormality in the chest.   Workstation:[id number]    XR chest pa & lateral    Result Date: 7/29/2023  Narrative: Study: Two-view chest. COMPARISON: May 14, 2023. HISTORY: Shortness of breath. Moderate size infiltrate right base greater than left essentially resolved. Mild residual atelectasis within the right lower lobe and left infrahilar compartment. The mid to upper lung fields are clear. No pleural effusions. Hilar mediastinal compartments are without adenopathy. Impression: IMPRESSION: Resolution to the moderate size infiltrate right base greater than left on earlier study. Mild degree of residual left infrahilar and right lower lobe atelectasis. Workstation:[id number]      Labs and pertinent reports reviewed. This note has been generated by voice recognition software system. Therefore, there may be spelling, grammar, and or syntax errors. Please contact if questions arise.

## 2023-08-17 NOTE — ASSESSMENT & PLAN NOTE
Possible ammonia given infiltrate seen on imaging  Drip score of 4, patient is immunocompromised  Legionella and strep pneumo antigen negative  Procalcitonin elevated although may be unreliable in the setting of malignancy  Has had previous bronchoscopy in the past without any definitive organism  Also on oxygen which patient does not normally use  Required up to 4 L-tapered and now on room air  PT/OT recommending STR vs HHT pending progress -patient leaning toward short-term rehab  Case management submitting for authorizations  S/p IV ceftriaxone and azithromycin x 3 days  Sputum culture - growing Pseudomonas  Transition to oral Levaquin to complete a 7-day antibiotic course

## 2023-08-17 NOTE — PROGRESS NOTES
[facility]  Progress Note  Name: Isabella Villalta  MRN: [id number]  Unit/Bed#: E4 -01 I Date of Admission: 8/14/2023   Date of Service: 8/17/2023 I Hospital Day: 3    Assessment/Plan   * Pneumonia  Assessment & Plan  Possible ammonia given infiltrate seen on imaging  Drip score of 4, patient is immunocompromised  Legionella and strep pneumo antigen negative  Procalcitonin elevated although may be unreliable in the setting of malignancy  Has had previous bronchoscopy in the past without any definitive organism  Also on oxygen which patient does not normally use  Required up to 4 L-tapered and now on room air  PT/OT recommending STR vs HHT pending progress -patient leaning toward short-term rehab  Case management submitting for authorizations  S/p IV ceftriaxone and azithromycin x 3 days  Sputum culture - growing Pseudomonas  Transition to oral Levaquin to complete a 7-day antibiotic course    Malignant neoplasm metastatic to brain [facility])  Assessment & Plan  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 oncology  Continue pain management and add MiraLAX and Senokot for constipation. Patient is contemplating hospice/palliative care but would like to continue current therapy at this time  No recommendations for G-CSF at this time per oncology  Oncology coordinating with [facility] oncology while patient is hospitalized   Patient will ultimately require outpatient follow-up with his [facility] providers to discuss further options - seen by palliative here     DEMARCO (dyspnea on exertion)  Assessment & Plan  History of ongoing dyspnea on exertion, previously evaluated at [facility] with possible pneumonitis  He remains on prednisone, CT of the chest ordered for further characterization of right lower lobe infiltrate  - Possibly related to patient's cancer.  Low clinical suspicion for pulmonary embolism given recent negative PE study from 3 days ago, patient is fully anticoagulated with Eliquis- reports compliance. Has had multiple admissions for dyspnea on exertion with negative PE and ACS work-ups. Anemia  Assessment & Plan  Likely related to patient's chemotherapy, does have pancytopenia  Hgb currently 7.9 -- 7.3  Transfuse for hemoglobin less than 7    Neutropenia (HCC)  Assessment & Plan  Continue neutropenic precautions    DVT (deep venous thrombosis) ([facility])  Assessment & Plan  Anticoagulated on Eliquis  Reports compliance    CKD (chronic kidney disease) stage 3, GFR 30-59 ml/min [facility])  Assessment & Plan  Lab Results   Component Value Date    EGFR 57 08/16/2023    EGFR 56 08/15/2023    EGFR 51 08/14/2023    CREATININE 1.25 08/16/2023    CREATININE 1.28 08/15/2023    CREATININE 1.38 (H) 08/14/2023   Renal function around historical baseline    BPH (benign prostatic hyperplasia)  Assessment & Plan  Resume medication for BPH and monitor urine output        VTE Pharmacologic Prophylaxis:   Pharmacologic: Apixaban (Eliquis)  Mechanical VTE Prophylaxis in Place: Yes    Discharge Plan: With need for continued inpatient stay for short-term rehab    Discussions with Specialists or Other Care Team Provider: Nursing, case management    Education and Discussions with Family / Patient: Patient, son via telephone-updated    Time Spent for Care: 1 hour. This time was spent on one or more of the following: performing physical exam; counseling and coordination of care; obtaining or reviewing history; documenting in the medical record; reviewing/ordering tests, medications, or procedures; communicating with other healthcare professionals and discussing with patient's family/caregivers. Current Length of Stay: 3 day(s)  Current Patient Status: Inpatient   Code Status: Level 3 - DNAR and DNI    Subjective:   Patient resting in bed.   He reports sensation of feeling short of breath, discussed tapering of oxygen and stable oxygen saturations on room air.  Reviewed previous CAT scans with patient discussing his history of smoking as well as emphysema. He is contemplating continuing further treatment with chemotherapy versus palliative/hospice. He would like to speak with his physicians at [facility] prior to making any other further decisions. Requested palliative care evaluation here. Objective:     Vitals:   Temp (24hrs), Av.4 °F (36.3 °C), Min:97 °F (36.1 °C), Max:98.1 °F (36.7 °C)    Temp:  [97 °F (36.1 °C)-98.1 °F (36.7 °C)] 97.2 °F (36.2 °C)  HR:  [89-95] 95  Resp:  [18-20] 18  BP: (113-136)/(78-84) 136/82  SpO2:  [95 %-99 %] 95 %  Body mass index is 27.73 kg/m². Input and Output Summary (last 24 hours):     No intake or output data in the 24 hours ending 23 1408    Physical Exam:     Physical Exam  Vitals and nursing note reviewed. Constitutional:       General: He is not in acute distress. Appearance: Normal appearance. He is normal weight. He is ill-appearing. He is not toxic-appearing or diaphoretic. Comments: Chronically ill-appearing   HENT:      Head: Normocephalic and atraumatic. Eyes:      General: No scleral icterus. Cardiovascular:      Rate and Rhythm: Normal rate and regular rhythm. Pulmonary:      Effort: Pulmonary effort is normal.      Breath sounds: Normal breath sounds. Abdominal:      General: Bowel sounds are normal. There is no distension. Palpations: Abdomen is soft. There is no mass. Tenderness: There is no abdominal tenderness. Hernia: No hernia is present. Musculoskeletal:         General: No swelling. Cervical back: Neck supple. Skin:     General: Skin is warm and dry. Neurological:      Mental Status: He is alert and oriented to person, place, and time. Mental status is at baseline.    Psychiatric:         Mood and Affect: Mood normal.         Behavior: Behavior normal.         Additional Data:     Labs:    Results from last 7 days   Lab Units 23  0636 08/15/23  0651   WBC Thousand/uL 2.69* 2.45*   HEMOGLOBIN g/dL 7.3* 7.9*   HEMATOCRIT % 22.2* 23.2*   PLATELETS Thousands/uL 83* 101*   LYMPHO PCT %  --  20   MONO PCT %  --  6   EOS PCT %  --  0     Results from last 7 days   Lab Units 08/16/23  0636 08/15/23  0500 08/14/23  0748   POTASSIUM mmol/L 3.6   < > 4.1   CHLORIDE mmol/L 108   < > 105   CO2 mmol/L 26   < > 24   BUN mg/dL 23   < > 29*   CREATININE mg/dL 1.25   < > 1.38*   CALCIUM mg/dL 7.7*   < > 8.7   ALK PHOS U/L  --   --  109*   ALT U/L  --   --  101*   AST U/L  --   --  42*    < > = values in this interval not displayed. * I Have Reviewed All Lab Data Listed Above. * Additional Pertinent Lab Tests Reviewed: [address] Admission Reviewed    Imaging:    Imaging Reports Reviewed Today Include: ct chest  Imaging Personally Reviewed by Myself Includes:      Recent Cultures (last 7 days):     Results from last 7 days   Lab Units 08/15/23  1226 08/14/23  1955 08/14/23  1706 08/14/23  0748   BLOOD CULTURE   --   --  No Growth at 48 hrs. No Growth at 48 hrs.    SPUTUM CULTURE  2+ Growth of Pseudomonas aeruginosa*  2+ Growth of  --   --   --    GRAM STAIN RESULT  1+ Epithelial cells per low power field*  1+ Polys*  1+ Budding yeast*  1+ Gram positive cocci in pairs*  --   --   --    LEGIONELLA URINARY ANTIGEN   --  Negative  --   --        Lines/Drains:  Invasive Devices     Peripheral Intravenous Line  Duration           Peripheral IV 08/14/23 Distal;Left;Upper;Ventral (anterior) Arm 3 days                Last 24 Hours Medication List:   Current Facility-Administered Medications   Medication Dose Route Frequency Provider Last Rate   • acetaminophen  650 mg Oral Q6H PRN Elizabeth Duncan, DO     • albuterol  2 puff Inhalation Q6H PRN Elizabeth Suggsch, DO     • aluminum-magnesium hydroxide-simethicone  30 mL Oral Q6H PRN Elizabeth Suggsch, DO     • apixaban  5 mg Oral BID Elizabeth Duncan, DO     • calcium carbonate  1,000 mg Oral Daily PRN Lalla Sans, DO     • cholecalciferol  1,000 Units Oral Daily Colton Shermans Dale Marleny, DO     • finasteride  5 mg Oral Daily Colton Shermans Dale Marleny, DO     • levofloxacin  750 mg Oral Q24H Dionicio Jordan PA-C     • lidocaine  1 patch Topical Daily Delilah Tan PA-C     • LORazepam  0.5 mg Intravenous Q6H PRN Dionicio Jordan PA-C     • melatonin  10.5 mg Oral HS Colton Shermans Dale Marleny, DO     • metoprolol succinate  12.5 mg Oral BID Lalla Sans, DO     • ondansetron  4 mg Intravenous Q6H PRN Lalla Sans, DO     • pantoprazole  40 mg Oral Daily Before Breakfast Colton Shermans Dale Marleny, DO     • polyethylene glycol  17 g Oral Daily Kelsi Sierra PA-C     • predniSONE  5 mg Oral Daily Colton Shermans Dale Marleny, DO     • senna  1 tablet Oral HS JERO Pena     • simethicone  80 mg Oral 4x Daily PRN Lalla Sans, DO     • sodium chloride  50 mL/hr Intravenous Continuous Dionicio Jordan PA-C     • umeclidinium-vilanterol  1 puff Inhalation Daily Colton Shermans Dale Marleny, DO          Today, Patient Was Seen By: Dionicio Jordan PA-C    ** Please Note: This note has been constructed using a voice recognition system.  **

## 2023-08-18 ENCOUNTER — APPOINTMENT (INPATIENT)
Dept: CT IMAGING | Facility: HOSPITAL | Age: 68
DRG: 177 | End: 2023-08-18
Payer: COMMERCIAL

## 2023-08-18 LAB
ALBUMIN SERPL BCP-MCNC: 3.1 G/DL (ref 3.5–5)
ALP SERPL-CCNC: 134 U/L (ref 34–104)
ALT SERPL W P-5'-P-CCNC: 104 U/L (ref 7–52)
ANION GAP SERPL CALCULATED.3IONS-SCNC: 4 MMOL/L
AST SERPL W P-5'-P-CCNC: 51 U/L (ref 13–39)
BILIRUB SERPL-MCNC: 0.59 MG/DL (ref 0.2–1)
BUN SERPL-MCNC: 18 MG/DL (ref 5–25)
CALCIUM ALBUM COR SERPL-MCNC: 9.1 MG/DL (ref 8.3–10.1)
CALCIUM SERPL-MCNC: 8.4 MG/DL (ref 8.4–10.2)
CHLORIDE SERPL-SCNC: 109 MMOL/L (ref 96–108)
CO2 SERPL-SCNC: 27 MMOL/L (ref 21–32)
CREAT SERPL-MCNC: 1.25 MG/DL (ref 0.6–1.3)
ERYTHROCYTE [DISTWIDTH] IN BLOOD BY AUTOMATED COUNT: 22.9 % (ref 11.6–15.1)
GFR SERPL CREATININE-BSD FRML MDRD: 57 ML/MIN/1.73SQ M
GLUCOSE SERPL-MCNC: 109 MG/DL (ref 65–140)
HCT VFR BLD AUTO: 24.8 % (ref 36.5–49.3)
HGB BLD-MCNC: 8 G/DL (ref 12–17)
MCH RBC QN AUTO: 34.8 PG (ref 26.8–34.3)
MCHC RBC AUTO-ENTMCNC: 32.3 G/DL (ref 31.4–37.4)
MCV RBC AUTO: 108 FL (ref 82–98)
PLATELET # BLD AUTO: 82 THOUSANDS/UL (ref 149–390)
PMV BLD AUTO: 9.1 FL (ref 8.9–12.7)
POTASSIUM SERPL-SCNC: 3.5 MMOL/L (ref 3.5–5.3)
PROT SERPL-MCNC: 4.9 G/DL (ref 6.4–8.4)
RBC # BLD AUTO: 2.3 MILLION/UL (ref 3.88–5.62)
SODIUM SERPL-SCNC: 140 MMOL/L (ref 135–147)
WBC # BLD AUTO: 10.06 THOUSAND/UL (ref 4.31–10.16)

## 2023-08-18 PROCEDURE — 85027 COMPLETE CBC AUTOMATED: CPT | Performed by: PHYSICIAN ASSISTANT

## 2023-08-18 PROCEDURE — G1004 CDSM NDSC: HCPCS

## 2023-08-18 PROCEDURE — 74176 CT ABD & PELVIS W/O CONTRAST: CPT

## 2023-08-18 PROCEDURE — 99223 1ST HOSP IP/OBS HIGH 75: CPT | Performed by: INTERNAL MEDICINE

## 2023-08-18 PROCEDURE — 99232 SBSQ HOSP IP/OBS MODERATE 35: CPT | Performed by: INTERNAL MEDICINE

## 2023-08-18 PROCEDURE — 80053 COMPREHEN METABOLIC PANEL: CPT | Performed by: PHYSICIAN ASSISTANT

## 2023-08-18 RX ORDER — OXYCODONE HYDROCHLORIDE 5 MG/1
5 TABLET ORAL EVERY 4 HOURS PRN
Status: DISCONTINUED | OUTPATIENT
Start: 2023-08-18 | End: 2023-08-20 | Stop reason: HOSPADM

## 2023-08-18 RX ADMIN — PREDNISONE 60 MG: 20 TABLET ORAL at 08:54

## 2023-08-18 RX ADMIN — LORAZEPAM 0.5 MG: 2 INJECTION INTRAMUSCULAR; INTRAVENOUS at 06:05

## 2023-08-18 RX ADMIN — OXYCODONE HYDROCHLORIDE 5 MG: 5 TABLET ORAL at 21:03

## 2023-08-18 RX ADMIN — Medication 1000 UNITS: at 08:46

## 2023-08-18 RX ADMIN — FILGRASTIM-AAFI 480 MCG: 480 INJECTION, SOLUTION SUBCUTANEOUS at 09:02

## 2023-08-18 RX ADMIN — PANTOPRAZOLE SODIUM 40 MG: 40 TABLET, DELAYED RELEASE ORAL at 06:08

## 2023-08-18 RX ADMIN — LIDOCAINE 1 PATCH: 50 PATCH CUTANEOUS at 08:57

## 2023-08-18 RX ADMIN — IOHEXOL 50 ML: 240 INJECTION, SOLUTION INTRATHECAL; INTRAVASCULAR; INTRAVENOUS; ORAL at 21:19

## 2023-08-18 RX ADMIN — ACETAMINOPHEN 325MG 650 MG: 325 TABLET ORAL at 12:55

## 2023-08-18 RX ADMIN — LEVOFLOXACIN 750 MG: 750 TABLET, FILM COATED ORAL at 13:41

## 2023-08-18 RX ADMIN — LORAZEPAM 0.5 MG: 2 INJECTION INTRAMUSCULAR; INTRAVENOUS at 12:51

## 2023-08-18 RX ADMIN — METOPROLOL SUCCINATE 12.5 MG: 25 TABLET, EXTENDED RELEASE ORAL at 17:04

## 2023-08-18 RX ADMIN — APIXABAN 5 MG: 5 TABLET, FILM COATED ORAL at 17:04

## 2023-08-18 RX ADMIN — ACETAMINOPHEN 325MG 650 MG: 325 TABLET ORAL at 00:40

## 2023-08-18 RX ADMIN — Medication 10.5 MG: at 21:03

## 2023-08-18 RX ADMIN — METOPROLOL SUCCINATE 12.5 MG: 25 TABLET, EXTENDED RELEASE ORAL at 08:46

## 2023-08-18 RX ADMIN — FINASTERIDE 5 MG: 5 TABLET, FILM COATED ORAL at 08:46

## 2023-08-18 RX ADMIN — APIXABAN 5 MG: 5 TABLET, FILM COATED ORAL at 08:46

## 2023-08-18 RX ADMIN — LORAZEPAM 0.5 MG: 2 INJECTION INTRAMUSCULAR; INTRAVENOUS at 18:55

## 2023-08-18 RX ADMIN — SENNOSIDES 8.6 MG: 8.6 TABLET, FILM COATED ORAL at 21:03

## 2023-08-18 RX ADMIN — UMECLIDINIUM BROMIDE AND VILANTEROL TRIFENATATE 1 PUFF: 62.5; 25 POWDER RESPIRATORY (INHALATION) at 08:47

## 2023-08-18 NOTE — ASSESSMENT & PLAN NOTE
Possible ammonia given infiltrate seen on imaging  Drip score of 4, patient is immunocompromised  Legionella and strep pneumo antigen negative  Procalcitonin elevated although may be unreliable in the setting of malignancy  Has had previous bronchoscopy in the past without any definitive organism  Also on oxygen which patient does not normally use  Required up to 4 L-tapered and now on room air  S/p IV ceftriaxone and azithromycin x 3 days  Sputum culture - growing Pseudomonas  Transition to oral Levaquin to complete a 7-day antibiotic course   PT/OT recommending STR vs HHT pending progress -likely going to do STR  Case management aware and working on

## 2023-08-18 NOTE — ASSESSMENT & PLAN NOTE
Likely related to patient's chemotherapy, does have pancytopenia  Hgb currently 7.9 -- 7.3 -- 8.0  Transfuse for hemoglobin less than 7

## 2023-08-18 NOTE — ASSESSMENT & PLAN NOTE
History of ongoing dyspnea on exertion, previously evaluated at [facility] with possible pneumonitis  He remains on chronic prednisone, CT of the chest ordered for further characterization of right lower lobe infiltrate- Possibly related to patient's cancer. Low clinical suspicion for pulmonary embolism given recent negative PE study from 3 days ago, patient is fully anticoagulated with Eliquis- reports compliance. Has had multiple admissions for dyspnea on exertion with negative PE and ACS work-ups. Pulmonary fibrosis may be contributing.   We have [facility] oncology recommended increasing chronic prednisone to 60 mg daily and having pulmonary consulted

## 2023-08-18 NOTE — CASE MANAGEMENT
Case Management Discharge Planning Note    Patient name Elizabeth Solares  Location [address] [facility] [address] MRN [id number]  : 1955 Date 2023       Current Admission Date: 2023  Current Admission Diagnosis:Pneumonia   Patient Active Problem List    Diagnosis Date Noted   • Pneumonia 2023   • Neutropenia ([address]) 2023   • DEMARCO (dyspnea on exertion) 2023   • Anemia 2023   • DVT (deep venous thrombosis) ([address]) 2023   • CKD (chronic kidney disease) stage 3, GFR 30-59 ml/min (HCC) 05/15/2023   • Malignant neoplasm metastatic to brain ([address]) 2023   • BPH (benign prostatic hyperplasia) 2022   • Esophageal cancer ([address]) 2022      LOS (days): 4  Geometric Mean LOS (GMLOS) (days): 4.00  Days to GMLOS:0.1     OBJECTIVE:  Risk of Unplanned Readmission Score: 17.5         Current admission status: Inpatient   Preferred Pharmacy:   [facility], [facility] [address]  [address] [place] [address], [address]  [place] [address]  Phone: [phone] Fax: [phone]    Primary Care Provider: No primary care provider on file. Primary Insurance: [employer] [facility] RE[facility]  Secondary Insurance:     DISCHARGE DETAILS:     Cm was notified by PAULY that pt is NOT medically cleared for d/c at this time. Pt follows [facility] & they have requested pt to see pulmonology team here before d/c. Pt's PT/OT recommendation was STR which has now changed to home w/ HH. Once pt is medically cleared for d/c CM to follow up w/ family on d/c plan. Likely tomorrow d/c. CM to continue to follow pt care & d/c.

## 2023-08-18 NOTE — CONSULTS
Consultation - Pulmonary Medicine   [facility] 77 y.o. male MRN: [id number]  Unit/Bed#: E4 -01 Encounter: [id number]      Assessment/Plan:    1. Acute hypoxic resp failure  2. Hx of nivolumab related pneumonitis, on chronic steroid  3. eso Ca with brain mets, s/p CRRT, currently on FOLFOX  4. Pancytopenia, probably related to chemotherapy  5. Pseudomonas pneumonia  6. Emphysema    Presented with dyspnea on excertion, noted at that time patient is pancytopenia. No fever, but elevated PCT on admission (0.45). CT scan done showed some reticular pattenrn and GGO on the left lower lung field. Which seems like in the past he had pneumonitis but the main lesion was on the right side which is resolving. Does not have image to compare (last CT scan on 8/11 showed resolution of GGO). Noted to have recent steroid taper (from 20 to 10mg QD by his pulmonologist)  S/C grows P. A treated with levofloxacin  Currently he was treated with G-CSF for neutropenia  Hypoxia resolved after hospitalization, now under room air     I favored his dyspnea on excertion is multifactorial. In the setting of chronic pneumonitis, anemia and possible lung infection. I do not have current image to compare but judging by the CT scan we had here there is not much of ground glass opacity indicating a severe pneumonitis flare up. Sometimes it can be seen with some underwent steroid taper. I would continue abx treatment. Continue to keep Hb above 7. I however would suggest to increase prednisone to 20mg instead of 60mg QD until we have the image to compare. He currently does not have any need for oxygen. If he plans to dc and I would favor him has early appointment with his pulmonary doctor to discuss with steroid use for his pneumonitis. Can continue albuterol as needed    Goals of care discussed. Patient would want to discuss further treatment plan with his oncologist. Will defer.     Suggest PT eval and check ambulating oxygen upon discharge    History of Present Illness   Physician Requesting Consult: Noemi De Jesus MD  Reason for Consult / Principal Problem: suspect pneumonitis flarei up  Hx and PE limited by: none  Chief Complaint: DEMARCO  HPI: Cyndi Medrano is a 77 y.o.  M with hx of esophageal Ca with brain mets on FOLFOX(last treatment on 8/9/2023), DVT on eliquis, CKD,  presented with dyspnea on excertion that is ongoing for at least few months that is worsening for the past few days. Patient had no lung disease in the past. He used to be a former smoker, 1pk/day for 40 years, quit in 2018. He was diagnosed with eso Ca in 7/2022 and he underwent neoadjuvent CRRT at that time. He had disease progression with tha mets s/p gamma knife in 12/2022. Then he was started palliative FOLFOX+Nivolumab where he developed Nivolumab pneumonitis which was treated in 3/2023. Nivolumab was dc ever since. Since then, he felt shortness of breath on ambulation. He was initially treated with 80mg prednisone with slow taper for his pneumonitis. Last CT chest done in 8/2023 showed improving for pneumonitis but with remaining lower lung GGOs and intersititial thickening (not with image to compare). He was recently visited by his pulmonologist and was told to change his prednisone from 20mg to 10mg, and to taper half each week. Currently he was taking 10mg at home. He presented to our ER on 8/14 due to progressive weakness and dyspnea on exertion. Initially he was found to have pancytopenia due to recent chemotherapy. He also required 4L N/C support. CT chest showed emphsematous changes, and lower lung with interstitial thickening and some GGOs, mainly on the left lower lung side. He was treated with empiric abx, yet sputum culture grows pseudomonas, as well as g-CSF for neutropenia. Over hospital course he had improvement for oxygen demand, but still complaints about dyspnea on excertion.  His outside oncologist were worried about whether to have pneumonitis flare up. Thus we were called for further evaluation. Seen and examined at bedside. Patient is awake, not in stress, and sat97% on RA when resting. However he was frustrated, stating this good saturation does not correlate with his symptoms and would want the n/c back. He said he would like to discuss with his oncologist to see what is the plan moving forward regarding his cancer treatment, and is considering hospice but not yet making any decision. Inpatient consult to Pulmonology     Performed by  Kaylen Galarza MD   Authorized by Abigail Modi PA-C             Review of Systems   Constitutional: Positive for activity change and fatigue. Negative for fever. Respiratory: Positive for shortness of breath. Negative for chest tightness and wheezing. Cardiovascular: Negative for chest pain, palpitations and leg swelling. Gastrointestinal: Negative for abdominal pain. Genitourinary: Negative for difficulty urinating. Musculoskeletal: Negative for back pain. Skin: Negative for pallor. Neurological: Positive for weakness. Negative for syncope and headaches. Historical Information   Past Medical History:   Diagnosis Date   • Cancer ([address])     esophageal   • DVT (deep venous thrombosis) ([address])      History reviewed. No pertinent surgical history.   Social History   Social History     Substance and Sexual Activity   Alcohol Use Not Currently     Social History     Substance and Sexual Activity   Drug Use Never     Social History     Tobacco Use   Smoking Status Former   • Types: Cigarettes   Smokeless Tobacco Not on file     E-Cigarette/Vaping     E-Cigarette/Vaping Substances   • Nicotine No    • THC No    • CBD No    • Flavoring No    • Other No    • Unknown No      Occupational History: non contributary    Family History: non-contributory    Meds/Allergies   all current active meds have been reviewed    Allergies   Allergen Reactions   • Levofloxacin Nausea Only   • Doxycycline GI Intolerance   • Penicillins GI Intolerance       Objective   Vitals: Blood pressure 124/88, pulse 103, temperature (!) 97.1 °F (36.2 °C), temperature source Temporal, resp. rate 17, height 5' 9" (1.753 m), weight 85.2 kg (187 lb 12.8 oz), SpO2 94 %. RA,Body mass index is 27.73 kg/m². Intake/Output Summary (Last 24 hours) at 8/18/2023 1007  Last data filed at 8/17/2023 2042  Gross per 24 hour   Intake --   Output 200 ml   Net -200 ml     Invasive Devices     Peripheral Intravenous Line  Duration           Peripheral IV 08/14/23 Distal;Left;Upper;Ventral (anterior) Arm 4 days                Physical Exam  Constitutional:       General: He is not in acute distress. Appearance: He is ill-appearing. Cardiovascular:      Rate and Rhythm: Normal rate and regular rhythm. Pulmonary:      Effort: Pulmonary effort is normal.      Breath sounds: No decreased breath sounds, wheezing, rhonchi or rales. Abdominal:      Palpations: Abdomen is soft. Musculoskeletal:         General: Normal range of motion. Right lower leg: No edema. Left lower leg: No edema. Skin:     General: Skin is warm. Neurological:      Mental Status: He is alert. Lab Results: I have personally reviewed pertinent lab results. Imaging Studies: I have personally reviewed pertinent reports. EKG, Pathology, and Other Studies: I have personally reviewed pertinent reports. Code Status: Level 3 - DNAR and DNI        Portions of the record may have been created with voice recognition software. Occasional wrong word or "sound a like" substitutions may have occurred due to the inherent limitations of voice recognition software. Read the chart carefully and recognize, using context, where substitutions have occurred.

## 2023-08-18 NOTE — ASSESSMENT & PLAN NOTE
Lab Results   Component Value Date    EGFR 57 08/18/2023    EGFR 57 08/16/2023    EGFR 56 08/15/2023    CREATININE 1.25 08/18/2023    CREATININE 1.25 08/16/2023    CREATININE 1.28 08/15/2023   Renal function around historical baseline

## 2023-08-18 NOTE — PROGRESS NOTES
[facility]  Progress Note  Name: Juan Daniel Rowe  MRN: [id number]  Unit/Bed#: E4 -01 I Date of Admission: 8/14/2023   Date of Service: 8/18/2023 I Hospital Day: 4    Assessment/Plan   * Pneumonia  Assessment & Plan  Possible ammonia given infiltrate seen on imaging  Drip score of 4, patient is immunocompromised  Legionella and strep pneumo antigen negative  Procalcitonin elevated although may be unreliable in the setting of malignancy  Has had previous bronchoscopy in the past without any definitive organism  Also on oxygen which patient does not normally use  Required up to 4 L-tapered and now on room air  S/p IV ceftriaxone and azithromycin x 3 days  Sputum culture - growing Pseudomonas  Transition to oral Levaquin to complete a 7-day antibiotic course   PT/OT recommending STR vs HHT pending progress -likely going to do STR  Case management aware and working on    Malignant neoplasm metastatic to brain [facility])  Assessment & Plan  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 Patient was contemplating hospice/palliative care but not ready yet  Administered G-CSF x1 per oncology  Oncology coordinated with [facility] oncology while patient is hospitalized   Patient will ultimately require outpatient follow-up with his [facility] providers to discuss further options - seen by palliative here in the mean time - goal to proceed with current therapy at this time   Will need referral to [address] [place], [place] [address] (dyspnea on exertion)  Assessment & Plan  History of ongoing dyspnea on exertion, previously evaluated at [facility] with possible pneumonitis  He remains on chronic prednisone, CT of the chest ordered for further characterization of right lower lobe infiltrate- Possibly related to patient's cancer.  Low clinical suspicion for pulmonary embolism given recent negative PE study from 3 days ago, patient is fully anticoagulated with Eliquis- reports compliance. Has had multiple admissions for dyspnea on exertion with negative PE and ACS work-ups. Pulmonary fibrosis may be contributing. We have [facility] oncology recommended increasing chronic prednisone to 60 mg daily and having pulmonary consulted    Anemia  Assessment & Plan  Likely related to patient's chemotherapy, does have pancytopenia  Hgb currently 7.9 -- 7.3 -- 8.0  Transfuse for hemoglobin less than 7    Neutropenia (HCC)  Assessment & Plan  Continue neutropenic precautions    DVT (deep venous thrombosis) ([facility])  Assessment & Plan  Anticoagulated on Eliquis  Reports compliance    CKD (chronic kidney disease) stage 3, GFR 30-59 ml/min [facility])  Assessment & Plan  Lab Results   Component Value Date    EGFR 57 08/18/2023    EGFR 57 08/16/2023    EGFR 56 08/15/2023    CREATININE 1.25 08/18/2023    CREATININE 1.25 08/16/2023    CREATININE 1.28 08/15/2023   Renal function around historical baseline    BPH (benign prostatic hyperplasia)  Assessment & Plan  Continue Proscar        VTE Pharmacologic Prophylaxis:   Pharmacologic: Apixaban (Eliquis)  Mechanical VTE Prophylaxis in Place: Yes    Discharge Plan: With continued inpatient stay for pulmonary consultation, and pending short-term rehab    Discussions with Specialists or Other Care Team Provider: Nursing, CM, pulmonary team     Education and Discussions with Family / Patient:  patient, son via telephone    Time Spent for Care: 45 minutes. This time was spent on one or more of the following: performing physical exam; counseling and coordination of care; obtaining or reviewing history; documenting in the medical record; reviewing/ordering tests, medications, or procedures; communicating with other healthcare professionals and discussing with patient's family/caregivers.     Current Length of Stay: 4 day(s)  Current Patient Status: Inpatient   Code Status: Level 3 - DNAR and DNI    Subjective:   Patient resting in bed sleeping upon arrival.  Again he offers similar complaints compared to yesterday consisting of tiredness, wanting to sleep, shortness of breath, and overall feels "ill."  He apparently was placed back on oxygen despite maintaining oxygen levels. Objective:     Vitals:   Temp (24hrs), Av.4 °F (36.3 °C), Min:97.1 °F (36.2 °C), Max:98.1 °F (36.7 °C)    Temp:  [97.1 °F (36.2 °C)-98.1 °F (36.7 °C)] 97.1 °F (36.2 °C)  HR:  [] 103  Resp:  [17-18] 17  BP: (124)/(79-88) 124/88  SpO2:  [91 %-96 %] 94 %  Body mass index is 27.73 kg/m². Input and Output Summary (last 24 hours): Intake/Output Summary (Last 24 hours) at 2023 1052  Last data filed at 2023 2042  Gross per 24 hour   Intake --   Output 200 ml   Net -200 ml       Physical Exam:     Physical Exam  Vitals and nursing note reviewed. Constitutional:       General: He is not in acute distress. Appearance: Normal appearance. He is obese. He is ill-appearing. He is not toxic-appearing or diaphoretic. HENT:      Head: Normocephalic and atraumatic. Eyes:      General: No scleral icterus. Cardiovascular:      Rate and Rhythm: Normal rate and regular rhythm. Pulmonary:      Breath sounds: No stridor. No rhonchi. Comments: On 2 L   Abdominal:      General: Bowel sounds are normal. There is no distension. Palpations: Abdomen is soft. There is no mass. Tenderness: There is no abdominal tenderness. Hernia: No hernia is present. Musculoskeletal:      Cervical back: Neck supple. No tenderness. Skin:     General: Skin is warm and dry. Neurological:      Mental Status: He is alert and oriented to person, place, and time. Mental status is at baseline.          Additional Data:     Labs:    Results from last 7 days   Lab Units 23  0944 23  0636 08/15/23  0651   WBC Thousand/uL 10.06   < > 2.45*   HEMOGLOBIN g/dL 8.0* < > 7.9*   HEMATOCRIT % 24.8*   < > 23.2*   PLATELETS Thousands/uL 82*   < > 101*   LYMPHO PCT %  --   --  20   MONO PCT %  --   --  6   EOS PCT %  --   --  0    < > = values in this interval not displayed. Results from last 7 days   Lab Units 08/18/23  0944   POTASSIUM mmol/L 3.5   CHLORIDE mmol/L 109*   CO2 mmol/L 27   BUN mg/dL 18   CREATININE mg/dL 1.25   CALCIUM mg/dL 8.4   ALK PHOS U/L 134*   ALT U/L 104*   AST U/L 51*           * I Have Reviewed All Lab Data Listed Above. * Additional Pertinent Lab Tests Reviewed: [address] Admission Reviewed    Imaging:    Imaging Reports Reviewed Today Include:   Imaging Personally Reviewed by Myself Includes:      Recent Cultures (last 7 days):     Results from last 7 days   Lab Units 08/15/23  1226 08/14/23  1955 08/14/23  1706 08/14/23  0748   BLOOD CULTURE   --   --  No Growth at 72 hrs. No Growth at 72 hrs.    SPUTUM CULTURE  2+ Growth of Pseudomonas aeruginosa*  2+ Growth of  --   --   --    GRAM STAIN RESULT  1+ Epithelial cells per low power field*  1+ Polys*  1+ Budding yeast*  1+ Gram positive cocci in pairs*  --   --   --    LEGIONELLA URINARY ANTIGEN   --  Negative  --   --        Lines/Drains:  Invasive Devices     Peripheral Intravenous Line  Duration           Peripheral IV 08/14/23 Distal;Left;Upper;Ventral (anterior) Arm 4 days                Last 24 Hours Medication List:   Current Facility-Administered Medications   Medication Dose Route Frequency Provider Last Rate   • acetaminophen  650 mg Oral Q6H PRN Ulus Organ, DO     • albuterol  2 puff Inhalation Q6H PRN Ulus Organ, DO     • aluminum-magnesium hydroxide-simethicone  30 mL Oral Q6H PRN Ulus Organ, DO     • apixaban  5 mg Oral BID Ulus Organ, DO     • calcium carbonate  1,000 mg Oral Daily PRN Colton Horn, DO     • cholecalciferol  1,000 Units Oral Daily Colton Horn, DO     • Filgrastim-aafi  480 mcg Subcutaneous Daily Long Benitez JERO Phillips     • finasteride  5 mg Oral Daily Colton Albert DO     • levalbuterol  1.25 mg Nebulization Q6H PRN Jose E Davison PA-C     • levofloxacin  750 mg Oral Q24H Jose E Davison PA-C     • lidocaine  1 patch Topical Daily Johanna Jarvis PA-C     • LORazepam  0.5 mg Intravenous Q6H PRN Jose E Davison PA-C     • melatonin  10.5 mg Oral HS Colton Albert DO     • metoprolol succinate  12.5 mg Oral BID Miah Armijo DO     • ondansetron  4 mg Intravenous Q6H PRN Miah Armijo DO     • pantoprazole  40 mg Oral Daily Before Breakfast Colton Albert DO     • polyethylene glycol  17 g Oral Daily Kelsi Sierra PA-C     • predniSONE  60 mg Oral Daily Kelsi Sierra PA-C     • senna  1 tablet Oral HS JERO Chowdhury     • simethicone  80 mg Oral 4x Daily PRN Miah Armijo DO     • sodium chloride  50 mL/hr Intravenous Continuous Kelsi Sierra PA-C 50 mL/hr (08/17/23 1531)   • umeclidinium-vilanterol  1 puff Inhalation Daily Colton Albert DO          Today, Patient Was Seen By: Jose E Davison PA-C    ** Please Note: This note has been constructed using a voice recognition system.  **

## 2023-08-18 NOTE — ASSESSMENT & PLAN NOTE
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     Patient was contemplating hospice/palliative care but not ready yet  Administered G-CSF x1 per oncology  Oncology coordinated with [facility] oncology while patient is hospitalized   Patient will ultimately require outpatient follow-up with his [facility] providers to discuss further options - seen by palliative here in the mean time - goal to proceed with current therapy at this time   Will need referral to [address] [place] [address]

## 2023-08-19 PROBLEM — R10.31 RIGHT GROIN PAIN: Status: ACTIVE | Noted: 2023-08-19

## 2023-08-19 LAB
ALBUMIN SERPL BCP-MCNC: 3.1 G/DL (ref 3.5–5)
ALP SERPL-CCNC: 147 U/L (ref 34–104)
ALT SERPL W P-5'-P-CCNC: 107 U/L (ref 7–52)
ANION GAP SERPL CALCULATED.3IONS-SCNC: 8 MMOL/L
ANISOCYTOSIS BLD QL SMEAR: PRESENT
APTT PPP: 25 SECONDS (ref 23–37)
AST SERPL W P-5'-P-CCNC: 53 U/L (ref 13–39)
BACTERIA BLD CULT: NORMAL
BACTERIA BLD CULT: NORMAL
BASOPHILS # BLD MANUAL: 0 THOUSAND/UL (ref 0–0.1)
BASOPHILS NFR MAR MANUAL: 0 % (ref 0–1)
BILIRUB SERPL-MCNC: 0.61 MG/DL (ref 0.2–1)
BUN SERPL-MCNC: 20 MG/DL (ref 5–25)
CALCIUM ALBUM COR SERPL-MCNC: 9 MG/DL (ref 8.3–10.1)
CALCIUM SERPL-MCNC: 8.3 MG/DL (ref 8.4–10.2)
CHLORIDE SERPL-SCNC: 106 MMOL/L (ref 96–108)
CO2 SERPL-SCNC: 23 MMOL/L (ref 21–32)
CREAT SERPL-MCNC: 1.3 MG/DL (ref 0.6–1.3)
DACRYOCYTES BLD QL SMEAR: PRESENT
DME PARACHUTE DELIVERY DATE REQUESTED: NORMAL
DME PARACHUTE ITEM DESCRIPTION: NORMAL
DME PARACHUTE ORDER STATUS: NORMAL
DME PARACHUTE SUPPLIER NAME: NORMAL
DME PARACHUTE SUPPLIER PHONE: NORMAL
DOHLE BOD BLD QL SMEAR: PRESENT
EOSINOPHIL # BLD MANUAL: 0 THOUSAND/UL (ref 0–0.4)
EOSINOPHIL NFR BLD MANUAL: 0 % (ref 0–6)
ERYTHROCYTE [DISTWIDTH] IN BLOOD BY AUTOMATED COUNT: 23.4 % (ref 11.6–15.1)
GFR SERPL CREATININE-BSD FRML MDRD: 55 ML/MIN/1.73SQ M
GLUCOSE SERPL-MCNC: 109 MG/DL (ref 65–140)
HCT VFR BLD AUTO: 23.5 % (ref 36.5–49.3)
HGB BLD-MCNC: 7.6 G/DL (ref 12–17)
INR PPP: 1.41 (ref 0.84–1.19)
LYMPHOCYTES # BLD AUTO: 0.9 THOUSAND/UL (ref 0.6–4.47)
LYMPHOCYTES # BLD AUTO: 8 % (ref 14–44)
MCH RBC QN AUTO: 34.7 PG (ref 26.8–34.3)
MCHC RBC AUTO-ENTMCNC: 32.3 G/DL (ref 31.4–37.4)
MCV RBC AUTO: 107 FL (ref 82–98)
MONOCYTES # BLD AUTO: 0.34 THOUSAND/UL (ref 0–1.22)
MONOCYTES NFR BLD: 3 % (ref 4–12)
NEUTROPHILS # BLD MANUAL: 10.01 THOUSAND/UL (ref 1.85–7.62)
NEUTS BAND NFR BLD MANUAL: 8 % (ref 0–8)
NEUTS SEG NFR BLD AUTO: 81 % (ref 43–75)
NRBC BLD AUTO-RTO: 1 /100 WBC (ref 0–2)
PLATELET # BLD AUTO: 83 THOUSANDS/UL (ref 149–390)
PLATELET BLD QL SMEAR: ABNORMAL
PMV BLD AUTO: 9.7 FL (ref 8.9–12.7)
POLYCHROMASIA BLD QL SMEAR: PRESENT
POTASSIUM SERPL-SCNC: 3.6 MMOL/L (ref 3.5–5.3)
PROT SERPL-MCNC: 4.9 G/DL (ref 6.4–8.4)
PROTHROMBIN TIME: 17.2 SECONDS (ref 11.6–14.5)
RBC # BLD AUTO: 2.19 MILLION/UL (ref 3.88–5.62)
SODIUM SERPL-SCNC: 137 MMOL/L (ref 135–147)
WBC # BLD AUTO: 11.25 THOUSAND/UL (ref 4.31–10.16)

## 2023-08-19 PROCEDURE — 85610 PROTHROMBIN TIME: CPT | Performed by: INTERNAL MEDICINE

## 2023-08-19 PROCEDURE — 85730 THROMBOPLASTIN TIME PARTIAL: CPT | Performed by: INTERNAL MEDICINE

## 2023-08-19 PROCEDURE — 80053 COMPREHEN METABOLIC PANEL: CPT | Performed by: INTERNAL MEDICINE

## 2023-08-19 PROCEDURE — 85027 COMPLETE CBC AUTOMATED: CPT | Performed by: INTERNAL MEDICINE

## 2023-08-19 PROCEDURE — 85007 BL SMEAR W/DIFF WBC COUNT: CPT | Performed by: INTERNAL MEDICINE

## 2023-08-19 PROCEDURE — 99232 SBSQ HOSP IP/OBS MODERATE 35: CPT | Performed by: PHYSICIAN ASSISTANT

## 2023-08-19 RX ADMIN — PREDNISONE 60 MG: 20 TABLET ORAL at 09:29

## 2023-08-19 RX ADMIN — UMECLIDINIUM BROMIDE AND VILANTEROL TRIFENATATE 1 PUFF: 62.5; 25 POWDER RESPIRATORY (INHALATION) at 09:35

## 2023-08-19 RX ADMIN — APIXABAN 5 MG: 5 TABLET, FILM COATED ORAL at 09:30

## 2023-08-19 RX ADMIN — LEVOFLOXACIN 750 MG: 750 TABLET, FILM COATED ORAL at 15:07

## 2023-08-19 RX ADMIN — Medication 10.5 MG: at 20:51

## 2023-08-19 RX ADMIN — LORAZEPAM 0.5 MG: 2 INJECTION INTRAMUSCULAR; INTRAVENOUS at 20:53

## 2023-08-19 RX ADMIN — FILGRASTIM-AAFI 480 MCG: 480 INJECTION, SOLUTION SUBCUTANEOUS at 09:34

## 2023-08-19 RX ADMIN — LORAZEPAM 0.5 MG: 2 INJECTION INTRAMUSCULAR; INTRAVENOUS at 03:40

## 2023-08-19 RX ADMIN — Medication 1000 UNITS: at 09:29

## 2023-08-19 RX ADMIN — PANTOPRAZOLE SODIUM 40 MG: 40 TABLET, DELAYED RELEASE ORAL at 06:29

## 2023-08-19 RX ADMIN — METOPROLOL SUCCINATE 12.5 MG: 25 TABLET, EXTENDED RELEASE ORAL at 17:05

## 2023-08-19 RX ADMIN — FINASTERIDE 5 MG: 5 TABLET, FILM COATED ORAL at 09:29

## 2023-08-19 RX ADMIN — APIXABAN 5 MG: 5 TABLET, FILM COATED ORAL at 17:05

## 2023-08-19 RX ADMIN — SENNOSIDES 8.6 MG: 8.6 TABLET, FILM COATED ORAL at 20:53

## 2023-08-19 RX ADMIN — METOPROLOL SUCCINATE 12.5 MG: 25 TABLET, EXTENDED RELEASE ORAL at 09:29

## 2023-08-19 NOTE — ASSESSMENT & PLAN NOTE
Possible ammonia given infiltrate seen on imaging  Drip score of 4, patient is immunocompromised  Legionella and strep pneumo antigen negative  Procalcitonin elevated although may be unreliable in the setting of malignancy  Has had previous bronchoscopy in the past without any definitive organism  Also required oxygen which patient does not normally use  S/p IV ceftriaxone and azithromycin x 3 days  Sputum culture - growing Pseudomonas  Transition to oral Levaquin to complete a 7-day antibiotic course day 6/7  Required up to 4 L-tapered but still on 1 L   PT/OT initially recommending short-term rehab but now recommending home health therapy  We will also be given rolling walker upon discharge  Case management setting up for wheelchair [place] [employer] transport home tomorrow

## 2023-08-19 NOTE — ASSESSMENT & PLAN NOTE
Lab Results   Component Value Date    EGFR 55 08/19/2023    EGFR 57 08/18/2023    EGFR 57 08/16/2023    CREATININE 1.30 08/19/2023    CREATININE 1.25 08/18/2023    CREATININE 1.25 08/16/2023   Renal function around historical baseline

## 2023-08-19 NOTE — ASSESSMENT & PLAN NOTE
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     Patient was contemplating hospice/palliative care but not ready yet  Administered G-CSF x1 per oncology  Oncology coordinated with [facility] oncology while patient is hospitalized   Patient will ultimately require outpatient follow-up with his [facility] providers to discuss further options - seen by palliative here in the mean time - goal to proceed with current therapy at this time   Will need outpatient referral to [address] [place] [address]

## 2023-08-19 NOTE — ASSESSMENT & PLAN NOTE
Patient complained of right-sided groin pain yesterday. Upon evaluation, nontender to touch. CT abdomen and pelvis performed -appendix at upper limits of normal in caliber however no surrounding inflammatory change, no evidence of bowel obstruction, no hernias in inguinal canals, no evidence of diverticulitis.   Discussed findings with patient  Denies further groin pain at this time  If pain returns or worsens, would evaluate right hip

## 2023-08-19 NOTE — PROGRESS NOTES
[address] [facility]  Progress Note  Name: Helen Mcfadden  MRN: [id number]  Unit/Bed#: E4 -01 I Date of Admission: 8/14/2023   Date of Service: 8/19/2023 I Hospital Day: 5    Assessment/Plan   * Pneumonia  Assessment & Plan  Possible ammonia given infiltrate seen on imaging  Drip score of 4, patient is immunocompromised  Legionella and strep pneumo antigen negative  Procalcitonin elevated although may be unreliable in the setting of malignancy  Has had previous bronchoscopy in the past without any definitive organism  Also required oxygen which patient does not normally use  S/p IV ceftriaxone and azithromycin x 3 days  Sputum culture - growing Pseudomonas  Transition to oral Levaquin to complete a 7-day antibiotic course day 6/7  Required up to 4 L-tapered but still on 1 L   PT/OT initially recommending short-term rehab but now recommending home health therapy  We will also be given rolling walker upon discharge  Case management setting up for wheelchair van transport home tomorrow    Malignant neoplasm metastatic to brain [facility])  Assessment & Plan  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 Patient was contemplating hospice/palliative care but not ready yet  Administered G-CSF x1 per oncology  Oncology coordinated with [facility] oncology while patient is hospitalized   Patient will ultimately require outpatient follow-up with his [facility] providers to discuss further options - seen by palliative here in the mean time - goal to proceed with current therapy at this time   Will need outpatient referral to [address] [place], [place] [address] (dyspnea on exertion)  Assessment & Plan  History of ongoing dyspnea on exertion, previously evaluated at [facility] 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  He remains on chronic prednisone, CT of the chest ordered for further characterization of right lower lobe infiltrate- Possibly related to patient's cancer. Low clinical suspicion for pulmonary embolism given recent negative PE study from 3 days ago, patient is fully anticoagulated with Eliquis- reports compliance. Has had multiple admissions for dyspnea on exertion with negative PE and ACS work-ups. Pulmonary fibrosis likely contributing. [facility] oncology recommended increasing chronic prednisone to 60 mg daily pulmonary consult. Treated for pneumonia here    Right groin pain  Assessment & Plan  Patient complained of right-sided groin pain yesterday. Upon evaluation, nontender to touch. CT abdomen and pelvis performed -appendix at upper limits of normal in caliber however no surrounding inflammatory change, no evidence of bowel obstruction, no hernias in inguinal canals, no evidence of diverticulitis. Discussed findings with patient  Denies further groin pain at this time  If pain returns or worsens, would evaluate right hip    Anemia  Assessment & Plan  Likely related to patient's chemotherapy, does have pancytopenia  Hgb currently 7.9 -- 7.3 -- 8.0 -- 7.6  Transfuse for hemoglobin less than 7    Neutropenia (HCC)  Assessment & Plan  Continue neutropenic precautions    DVT (deep venous thrombosis) ([address])  Assessment & Plan  Compliant with anticoagulation-continue Eliquis    CKD (chronic kidney disease) stage 3, GFR 30-59 ml/min [facility])  Assessment & Plan  Lab Results   Component Value Date    EGFR 55 08/19/2023    EGFR 57 08/18/2023    EGFR 57 08/16/2023    CREATININE 1.30 08/19/2023    CREATININE 1.25 08/18/2023    CREATININE 1.25 08/16/2023   Renal function around historical baseline    BPH (benign prostatic hyperplasia)  Assessment & Plan  Continue Proscar    VTE Pharmacologic Prophylaxis:   Pharmacologic: Apixaban (Eliquis)  Mechanical VTE Prophylaxis in Place: Yes    Discharge Plan:  With need for continued inpatient stay for home O2 evaluation. Setting up for wheelchair [place] transport-likely to occur tomorrow    Discussions with Specialists or Other Care Team Provider: Nursing, case management, Dr. Sparkle Lopez    Education and Discussions with Family / Patient: Patient, son via telephone    Time Spent for Care: 45 minutes. This time was spent on one or more of the following: performing physical exam; counseling and coordination of care; obtaining or reviewing history; documenting in the medical record; reviewing/ordering tests, medications, or procedures; communicating with other healthcare professionals and discussing with patient's family/caregivers. Current Length of Stay: 5 day(s)  Current Patient Status: Inpatient   Code Status: Level 3 - DNAR and DNI    Subjective:   Resting in bed. Feels slightly better today. He denies continued groin pain. Discussed results of CAT scan with the patient. Awaiting home O2 eval.    Objective:     Vitals:   Temp (24hrs), Av.1 °F (36.2 °C), Min:96.6 °F (35.9 °C), Max:97.7 °F (36.5 °C)    Temp:  [96.6 °F (35.9 °C)-97.7 °F (36.5 °C)] 97 °F (36.1 °C)  HR:  [] 66  Resp:  [17-18] 18  BP: (115-126)/(63-80) 124/76  SpO2:  [97 %-98 %] 97 %  Body mass index is 27.73 kg/m². Input and Output Summary (last 24 hours): Intake/Output Summary (Last 24 hours) at 2023 1453  Last data filed at 2023 0701  Gross per 24 hour   Intake --   Output 600 ml   Net -600 ml       Physical Exam:     Physical Exam  Vitals and nursing note reviewed. Constitutional:       Appearance: He is normal weight. He is ill-appearing. He is not toxic-appearing. HENT:      Head: Normocephalic and atraumatic. Eyes:      General: No scleral icterus. Cardiovascular:      Rate and Rhythm: Normal rate and regular rhythm. Pulmonary:      Comments: On 1 L nasal cannula  Abdominal:      General: Bowel sounds are normal. There is no distension. Palpations: Abdomen is soft. There is no mass. Tenderness:  There is no abdominal tenderness. Hernia: No hernia is present. Musculoskeletal:         General: No swelling. Cervical back: Neck supple. Skin:     General: Skin is warm and dry. Neurological:      Mental Status: He is alert and oriented to person, place, and time. Mental status is at baseline. Psychiatric:         Mood and Affect: Mood normal.         Behavior: Behavior normal.         Additional Data:     Labs:    Results from last 7 days   Lab Units 08/19/23  0633   WBC Thousand/uL 11.25*   HEMOGLOBIN g/dL 7.6*   HEMATOCRIT % 23.5*   PLATELETS Thousands/uL 83*   LYMPHO PCT % 8*   MONO PCT % 3*   EOS PCT % 0     Results from last 7 days   Lab Units 08/19/23  0633   POTASSIUM mmol/L 3.6   CHLORIDE mmol/L 106   CO2 mmol/L 23   BUN mg/dL 20   CREATININE mg/dL 1.30   CALCIUM mg/dL 8.3*   ALK PHOS U/L 147*   ALT U/L 107*   AST U/L 53*     Results from last 7 days   Lab Units 08/19/23  0633   INR  1.41*       * I Have Reviewed All Lab Data Listed Above. * Additional Pertinent Lab Tests Reviewed: [facility] Admission Reviewed    Imaging:    Imaging Reports Reviewed Today Include:   Imaging Personally Reviewed by Myself Includes:      Recent Cultures (last 7 days):     Results from last 7 days   Lab Units 08/15/23  1226 08/14/23  1955 08/14/23  1706 08/14/23  0748   BLOOD CULTURE   --   --  No Growth After 4 Days. No Growth After 4 Days.    SPUTUM CULTURE  2+ Growth of Pseudomonas aeruginosa*  2+ Growth of  --   --   --    GRAM STAIN RESULT  1+ Epithelial cells per low power field*  1+ Polys*  1+ Budding yeast*  1+ Gram positive cocci in pairs*  --   --   --    LEGIONELLA URINARY ANTIGEN   --  Negative  --   --        Lines/Drains:  Invasive Devices     Peripheral Intravenous Line  Duration           Peripheral IV 08/18/23 Left;Upper;Ventral (anterior) Arm 1 day    Peripheral IV 08/18/23 Right;Ventral (anterior) Wrist 1 day                Last 24 Hours Medication List:   Current Facility-Administered Medications   Medication Dose Route Frequency Provider Last Rate   • acetaminophen  650 mg Oral Q6H PRN Dorjericann Javan, DO     • albuterol  2 puff Inhalation Q6H PRN Devon Javan, DO     • aluminum-magnesium hydroxide-simethicone  30 mL Oral Q6H PRN Dormichele Javan, DO     • apixaban  5 mg Oral BID Dorisann Javan, DO     • calcium carbonate  1,000 mg Oral Daily PRN Devon Javan, DO     • cholecalciferol  1,000 Units Oral Daily Colton Alberts, DO     • Filgrastim-aafi  480 mcg Subcutaneous Daily [facility], [address] [place] [address]     • finasteride  5 mg Oral Daily Colton Powers, DO     • levalbuterol  1.25 mg Nebulization Q6H PRN Husam Hess PA-C     • levofloxacin  750 mg Oral Q24H Husam Hess PA-C     • lidocaine  1 patch Topical Daily Boy Mariee PA-C     • LORazepam  0.5 mg Intravenous Q6H PRN Husam Hess PA-C     • melatonin  10.5 mg Oral HS Colton Powers, DO     • metoprolol succinate  12.5 mg Oral BID Dormichele Javan, DO     • ondansetron  4 mg Intravenous Q6H PRN Devon Moranull, DO     • oxyCODONE  5 mg Oral Q4H PRN Shahana Connolly MD     • pantoprazole  40 mg Oral Daily Before Breakfast Colton Powers, DO     • polyethylene glycol  17 g Oral Daily Kelsi Sierra PA-C     • predniSONE  60 mg Oral Daily Husam Hess PA-C     • senna  1 tablet Oral HS JERO Hsu     • simethicone  80 mg Oral 4x Daily PRN Devon Javan, DO     • umeclidinium-vilanterol  1 puff Inhalation Daily Colton Powers, DO          Today, Patient Was Seen By: Husam Hess PA-C    ** Please Note: This note has been constructed using a voice recognition system.  **

## 2023-08-19 NOTE — CASE MANAGEMENT
Case Management Discharge Planning Note    Patient name Elizabeth Solares  Location [address] [facility] [id number] [place] [address] [place] 433/E4 [address]-* MRN [id number]  : 1955 Date 2023       Current Admission Date: 2023  Current Admission Diagnosis:Pneumonia   Patient Active Problem List    Diagnosis Date Noted   • Right groin pain 2023   • Pneumonia 2023   • Neutropenia ([address]) 2023   • DEMARCO (dyspnea on exertion) 2023   • Anemia 2023   • DVT (deep venous thrombosis) ([address]) 2023   • CKD (chronic kidney disease) stage 3, GFR 30-59 ml/min ([address]) 05/15/2023   • Malignant neoplasm metastatic to brain ([address]) 2023   • BPH (benign prostatic hyperplasia) 2022   • Esophageal cancer ([address]) 2022      LOS (days): 5  Geometric Mean LOS (GMLOS) (days): 4.00  Days to GMLOS:-1     OBJECTIVE:  Risk of Unplanned Readmission Score: 22.98         Current admission status: Inpatient   Preferred Pharmacy:   [address] [facility], [address] [place] [address], [address]  [address] [place] [address], UNIT [place] [address]  Phone: [phone] Fax: [phone]    Primary Care Provider: No primary care provider on file. Primary Insurance: [facility] REP  Secondary Insurance:     DISCHARGE DETAILS:      CM received consult for walker. CM placed order via [employer]. Order approved. CM delivered DME to patient's room. Delivery ticket signed and placed in bin for DME liaison.

## 2023-08-19 NOTE — ASSESSMENT & PLAN NOTE
Likely related to patient's chemotherapy, does have pancytopenia  Hgb currently 7.9 -- 7.3 -- 8.0 -- 7.6  Transfuse for hemoglobin less than 7

## 2023-08-19 NOTE — CASE MANAGEMENT
Case Management Discharge Planning Note    Patient name Rene Osullivan  Location [address] [facility] [address]-* MRN [id number]  : 1955 Date 2023       Current Admission Date: 2023  Current Admission Diagnosis:Pneumonia   Patient Active Problem List    Diagnosis Date Noted   • Pneumonia 2023   • Neutropenia ([address]) 2023   • DEMARCO (dyspnea on exertion) 2023   • Anemia 2023   • DVT (deep venous thrombosis) ([address]) 2023   • CKD (chronic kidney disease) stage 3, GFR 30-59 ml/min (HCC) 05/15/2023   • Malignant neoplasm metastatic to brain ([address]) 2023   • BPH (benign prostatic hyperplasia) 2022   • Esophageal cancer ([address]) 2022      LOS (days): 5  Geometric Mean LOS (GMLOS) (days): 4.00  Days to GMLOS:-0.8     OBJECTIVE:  Risk of Unplanned Readmission Score: 22.93         Current admission status: Inpatient   Preferred Pharmacy:   [facility], [address] [place] [address] ., [address]  [address] [place] [address], [facility] [address]  Phone: [phone] Fax: [phone]    Primary Care Provider: No primary care provider on file.     Primary Insurance: [facility]  Secondary Insurance:     DISCHARGE DETAILS:    Discharge planning discussed with[de-identified] Son Aura Alvarez GUADARRAMA contacted family/caregiver?: Yes  Were Treatment Team discharge recommendations reviewed with patient/caregiver?: Yes  Did patient/caregiver verbalize understanding of patient care needs?: Yes  Were patient/caregiver advised of the risks associated with not following Treatment Team discharge recommendations?: Yes    Contacts  Patient Contacts: Rene Grant (son) [phone]  Relationship to Patient[de-identified] Family  Contact Method: Phone  Phone Number: [phone]    Requested [address]         Is the patient interested in [facility] at discharge?: Yes  [facility] requested[de-identified] Physical Therapy, Occupational Therapy, [address] Agency Name[de-identified] [facility] Follow-Up Provider[de-identified] PCP  Home Health Services Needed[de-identified] Strengthening/Theraputic Exercises to Improve Function, Oxygen Via Nasal Cannula, Gait/ADL Training, Evaluate Functional Status and Safety  Oxygen LPM Ordered (if applicable based on home health services needed):: 1 LPM  Homebound Criteria Met[de-identified] Requires the Assistance of Another Person for Safe Ambulation or to Leave the Home, Uses an Assist Device (i.e. cane, walker, etc)  Supporting Clincal Findings[de-identified] Limited Endurance, Requires Oxygen, Fatigues Easliy in Short Distances, Dyspnea with Exertion    DME Referral Provided  Referral made for DME?: Yes  DME referral completed for the following items[de-identified] Cleave Ludy  DME Supplier Name[de-identified] [employer]    Treatment Team Recommendation: Home with [address] [facility]  Additional Comments: CM spoke with jose French. PT recommended HHA pending jose Chapa okay with that. Referrals made for HHA. Vini Oh chosen by jose French. Attempted to get in contact with jose Chapa to speak with him about home health. Called twice,once in the morning and afternoon with no answer and no way to leave a VM.  DME walker ordered

## 2023-08-19 NOTE — RESPIRATORY THERAPY NOTE
Home Oxygen Qualifying Test     Patient name: Elizabeth Solares        : 1955   Date of Test:  2023  Diagnosis:    Home Oxygen Test:    **Medicare Guidelines require item(s) 1-5 on all ambulatory patients or 1 and 2 on non-ambulatory patients. 1. Baseline SPO2 on Room Air at rest 85%   a. If <= 88% on Room Air add O2 via NC to obtain SpO2 >=88%. If LPM needed, document LPM  needed to reach =>88%    2. SPO2 during exertion on Room Air N/A.  a. During exertion monitor SPO2. If SPO2 increases >=89%, do not add supplemental oxygen    3. SPO2 on Oxygen at Rest 93% at 2 LPM    4. SPO2 during exertion on Oxygen 94% at 2 LPM    5. Test performed during exertion activity. Patient walked in hallway for approximately six minutes. [x]  Supplemental Home Oxygen is indicated. []  Client does not qualify for home oxygen. Respiratory Additional Notes-  Patient ambulated in hallway with assistance of walker.      [facility], RT

## 2023-08-19 NOTE — ASSESSMENT & PLAN NOTE
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 He remains on chronic prednisone, CT of the chest ordered for further characterization of right lower lobe infiltrate- Possibly related to patient's cancer. Low clinical suspicion for pulmonary embolism given recent negative PE study from 3 days ago, patient is fully anticoagulated with Eliquis- reports compliance. Has had multiple admissions for dyspnea on exertion with negative PE and ACS work-ups. Pulmonary fibrosis likely contributing. [facility] oncology recommended increasing chronic prednisone to 60 mg daily pulmonary consult.  Treated for pneumonia here

## 2023-08-20 VITALS
TEMPERATURE: 97.1 F | SYSTOLIC BLOOD PRESSURE: 124 MMHG | HEART RATE: 58 BPM | BODY MASS INDEX: 27.81 KG/M2 | OXYGEN SATURATION: 98 % | DIASTOLIC BLOOD PRESSURE: 60 MMHG | RESPIRATION RATE: 18 BRPM | HEIGHT: 69 IN | WEIGHT: 187.8 LBS

## 2023-08-20 PROBLEM — J96.01 ACUTE RESPIRATORY FAILURE WITH HYPOXIA (HCC): Status: ACTIVE | Noted: 2023-08-20

## 2023-08-20 LAB

## 2023-08-20 PROCEDURE — 99239 HOSP IP/OBS DSCHRG MGMT >30: CPT | Performed by: PHYSICIAN ASSISTANT

## 2023-08-20 RX ORDER — PREDNISONE 20 MG/1
60 TABLET ORAL DAILY
Qty: 30 TABLET | Refills: 0 | Status: SHIPPED | OUTPATIENT
Start: 2023-08-20

## 2023-08-20 RX ADMIN — FINASTERIDE 5 MG: 5 TABLET, FILM COATED ORAL at 08:41

## 2023-08-20 RX ADMIN — FILGRASTIM-AAFI 480 MCG: 480 INJECTION, SOLUTION SUBCUTANEOUS at 08:42

## 2023-08-20 RX ADMIN — APIXABAN 5 MG: 5 TABLET, FILM COATED ORAL at 08:40

## 2023-08-20 RX ADMIN — LEVOFLOXACIN 750 MG: 750 TABLET, FILM COATED ORAL at 14:58

## 2023-08-20 RX ADMIN — PREDNISONE 60 MG: 20 TABLET ORAL at 08:41

## 2023-08-20 RX ADMIN — UMECLIDINIUM BROMIDE AND VILANTEROL TRIFENATATE 1 PUFF: 62.5; 25 POWDER RESPIRATORY (INHALATION) at 08:40

## 2023-08-20 RX ADMIN — PANTOPRAZOLE SODIUM 40 MG: 40 TABLET, DELAYED RELEASE ORAL at 06:13

## 2023-08-20 RX ADMIN — METOPROLOL SUCCINATE 12.5 MG: 25 TABLET, EXTENDED RELEASE ORAL at 08:40

## 2023-08-20 RX ADMIN — Medication 1000 UNITS: at 08:41

## 2023-08-20 NOTE — ASSESSMENT & PLAN NOTE
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 He remains on chronic prednisone, CT chest ordered for further characterization of right lower lobe infiltrate. Low clinical suspicion for pulmonary embolism given recent negative PE study from 8/11/23, patient is fully anticoagulated with Eliquis and with reported compliance. Has had multiple admissions for dyspnea on exertion with negative PE and ACS work-ups. Pulmonary fibrosis likely contributing factor as well. [facility] Oncology recommended increasing chronic prednisone to 60 mg daily along with pulmonary consult. Treated for pneumonia with antibiotics.

## 2023-08-20 NOTE — ASSESSMENT & PLAN NOTE
Possible pneumonia given infiltrate seen on imaging  Drip score of 4, patient is immunocompromised  Legionella and strep pneumo antigen negative  Procalcitonin elevated although may be unreliable in the setting of malignancy  Has had previous bronchoscopy in the past without any definitive organism  Also required oxygen which patient does not normally use  S/p IV ceftriaxone and azithromycin x 3 days  Sputum culture - growing Pseudomonas  Transitioned to oral Levaquin to complete a 7-day antibiotic course day 6/7  Remains on 2 L NC and qualifies for home oxygen 2 L with rest and exertion  PT/OT initially recommending short-term rehab but now recommending home health therapy  Patient also provided with rolling walker  Case management setting up for wheelchair [place] transport home today  CM providing [facility] [employer] application to assist with outpatient transportation to appointments

## 2023-08-20 NOTE — ASSESSMENT & PLAN NOTE
Patient complained of right-sided groin pain during his stay  Upon evaluation, nontender to touch. CT abdomen and pelvis performed -appendix at upper limits of normal in caliber however no surrounding inflammatory change, no evidence of bowel obstruction, no hernias in inguinal canals, no evidence of diverticulitis.   Discussed findings with patient  Denies further groin pain at this time - resolved over the span of 24 hrs  If pain returns or worsens, would evaluate right hip

## 2023-08-20 NOTE — ASSESSMENT & PLAN NOTE
Likely related to patient's chemotherapy, does have pancytopenia  Hgb trend 7.9 -- 7.3 -- 8.0 -- 7.6  Patient did not require a transfusion here

## 2023-08-20 NOTE — CASE MANAGEMENT
Case Management Discharge Planning Note    Patient name Fatima Felty  Location [facility] 4 [address] MRN [id number]  : 1955 Date 2023       Current Admission Date: 2023  Current Admission Diagnosis:Pneumonia   Patient Active Problem List    Diagnosis Date Noted   • Acute respiratory failure with hypoxia ([address]) 2023   • Right groin pain 2023   • Pneumonia 2023   • Neutropenia ([address]) 2023   • DEMARCO (dyspnea on exertion) 2023   • Anemia 2023   • DVT (deep venous thrombosis) ([address]) 2023   • CKD (chronic kidney disease) stage 3, GFR 30-59 ml/min (HCC) 05/15/2023   • Malignant neoplasm metastatic to brain ([address]) 2023   • BPH (benign prostatic hyperplasia) 2022   • Esophageal cancer ([address]) 2022      LOS (days): 6  Geometric Mean LOS (GMLOS) (days): 4.00  Days to GMLOS:-1.8     OBJECTIVE:  Risk of Unplanned Readmission Score: 22.79         Current admission status: Inpatient   Preferred Pharmacy:   [facility], [address] [facility], [address]  [address] [place] [address], [facility] [address]  Phone: [phone] Fax: [phone]    Primary Care Provider: No primary care provider on file.     Primary Insurance: Minh STALEY  Secondary Insurance:     DISCHARGE DETAILS:    Discharge planning discussed with[de-identified] Patient  Freedom of Choice: Yes     CM contacted family/caregiver?: Yes  Were Treatment Team discharge recommendations reviewed with patient/caregiver?: Yes  Did patient/caregiver verbalize understanding of patient care needs?: Yes  Were patient/caregiver advised of the risks associated with not following Treatment Team discharge recommendations?: Yes    Transport at Discharge : Wheelchair van  Dispatcher Contacted: Yes  Number/Name of Dispatcher: Round trip  Transported by [employer] and Unit #): [employer]  ETA of Transport (Date): 23  ETA of Transport (Time): 1500     Transfer Mode: Wheelchair        IMM Given (Date):: 08/20/23  IMM Given to[de-identified] Patient      THIERRY placed order via geolad. Order approved. THIERRY delivered oxygen tank to patient;s room. Delivery ticket signed and placed in bin for DME liaison.

## 2023-08-20 NOTE — ASSESSMENT & PLAN NOTE
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

## 2023-08-20 NOTE — DISCHARGE SUMMARY
[address] [facility]  Discharge- Oswald Meth 1955, 77 y.o. male MRN: [id number]  Unit/Bed#: E4 -01 Encounter: [id number]  Primary Care Provider: No primary care provider on file.    Date and time admitted to hospital: 8/14/2023  7:08 AM    * Pneumonia  Assessment & Plan  Possible pneumonia given infiltrate seen on imaging  Drip score of 4, patient is immunocompromised  Legionella and strep pneumo antigen negative  Procalcitonin elevated although may be unreliable in the setting of malignancy  Has had previous bronchoscopy in the past without any definitive organism  Also required oxygen which patient does not normally use  S/p IV ceftriaxone and azithromycin x 3 days  Sputum culture - growing Pseudomonas  Transitioned to oral Levaquin to complete a 7-day antibiotic course day 6/7  Remains on 2 L NC and qualifies for home oxygen 2 L with rest and exertion  PT/OT initially recommending short-term rehab but now recommending home health therapy  Patient also provided with rolling walker  Case management setting up for wheelchair [facility] transport home today  CM providing [employer] application to assist with outpatient transportation to appointments    Malignant neoplasm metastatic to brain [facility])  Assessment & Plan  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   Follows with [facility] oncology  Patient was contemplating hospice/palliative care but not ready yet  Administered G-CSF x1 per oncology  Oncology coordinated with the patients oncologist at [facility] oncology while the patient was hospitalized   Patient will ultimately require outpatient follow-up with his [facility] providers to discuss further options - seen by palliative here in the mean time - goal to proceed with current therapy at this time   Will need outpatient referral to [facility] palliative care for coordination of care    DEMARCO (dyspnea on exertion)  Assessment & Plan  History of ongoing dyspnea on exertion, previously evaluated at [facility] 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 He remains on chronic prednisone, CT chest ordered for further characterization of right lower lobe infiltrate. Low clinical suspicion for pulmonary embolism given recent negative PE study from 8/11/23, patient is fully anticoagulated with Eliquis and with reported compliance. Has had multiple admissions for dyspnea on exertion with negative PE and ACS work-ups. Pulmonary fibrosis likely contributing factor as well. [facility] Oncology recommended increasing chronic prednisone to 60 mg daily along with pulmonary consult. Treated for pneumonia with antibiotics. Acute respiratory failure with hypoxia (HCC)  Assessment & Plan  Requiring 2 L oxygen nasal cannula while here  Likely secondary to acute pneumonia and underlying lung disease  Evaluated by respiratory therapy and qualifies for home oxygen  Will be set up for oxygen 2 L at rest and with exertion    Right groin pain  Assessment & Plan  Patient complained of right-sided groin pain during his stay  Upon evaluation, nontender to touch. CT abdomen and pelvis performed -appendix at upper limits of normal in caliber however no surrounding inflammatory change, no evidence of bowel obstruction, no hernias in inguinal canals, no evidence of diverticulitis.   Discussed findings with patient  Denies further groin pain at this time - resolved over the span of 24 hrs  If pain returns or worsens, would evaluate right hip    Anemia  Assessment & Plan  Likely related to patient's chemotherapy, does have pancytopenia  Hgb trend 7.9 -- 7.3 -- 8.0 -- 7.6  Patient did not require a transfusion here    Neutropenia ([address])  Assessment & Plan  Continue neutropenic precautions    DVT (deep venous thrombosis) ([address])  Assessment & Plan  Compliant with anticoagulation-continue Eliquis    CKD (chronic kidney disease) stage 3, GFR 30-59 ml/min ([facility])  Assessment & Plan  Lab Results   Component Value Date    EGFR 55 08/19/2023    EGFR 57 08/18/2023    EGFR 57 08/16/2023    CREATININE 1.30 08/19/2023    CREATININE 1.25 08/18/2023    CREATININE 1.25 08/16/2023   Renal function around historical baseline    BPH (benign prostatic hyperplasia)  Assessment & Plan  Continue Proscar      Consultations During Hospital Stay:  · Pulmonary   · Oncology Hematology  · Palliative care    Procedures Performed:   CT abdomen pelvis wo contrast  Result Date: 8/19/2023  Impression: Mild thickening of the distal esophagus Small pleural effusions. No evidence of small bowel obstruction. Hyperdense cyst or nodule appears to be evident. Nonemergent follow-up ultrasound may be helpful although we are attempting to obtain prior studies to compare. The appendix is at the upper limits of normal in caliber but without surrounding inflammatory change. Clinical correlation with any right lower quadrant tenderness is advised. If symptoms should persist a follow-up study may be useful. This was discussed with Dr. Josephine Ba at 12 noon. Previous studies have been requested from [facility] Workstation performed: [id number]     CT chest wo contrast  Result Date: 8/14/2023  Impression: No definite acute disease. Small cluster of tree-in-bud nodules in the lateral right upper lobe, likely minimal bronchiolitis. Mild lower lobe basilar predominant reticulation and traction bronchiolectasis suggestive of mild fibrosis. No visible esophageal malignancy. Workstation performed: [id number]     CT soft tissue neck  Result Date: 8/14/2023  Impression: No nodular enhancing lesions identified along the course of the aerodigestive tract. No discrete edema or collection identified. Correlate with outside prior exams for findings related to the patient's known clinical history of distal esophageal neoplasm.  Workstation performed: [id number]     XR chest 1 view portable  Result Date: 8/14/2023  · Impression: Faint groundglass opacity at right lung base suspicious for early infiltrate versus atelectasis Workstation performed: [id number]     Significant Findings / Test Results:   · Pneumonia  · Acute respiratory failure with hypoxia  · Chronic anemia    Incidental Findings:   · None    Test Results Pending at Discharge (will require follow up): · None     Outpatient follow-up requested:  · [address] Mo [place]sen    Complications:  none    Hospital Course:     Jaz Gold is a 77 y.o. male patient who originally presented to the hospital on 8/14/2023 due to shortness of breath x1 week. Patient receives all of his care through [facility] however was brought to [facility] given LVH was on divert. He was found to have possible infiltrate on imaging and started on IV antibiotics with ceftriaxone and azithromycin. His procalcitonin was elevated although may be unreliable in the setting of malignancy. His sputum culture ended up growing Pseudomonas and antibiotics were transitioned to oral Levaquin for additional coverage to complete a 7 day course. He was seen in consultation by oncology here who conferred with patient's oncologist at [facility].  Recommendations included increasing patient's chronic prednisone up to 60 mg daily given findings of fibrosis on recent CT imaging. They also recommended pulmonary consult and he was here by our pulmonary team.  During the patient's stay, goals of care was addressed as well as palliative care consultation. Currently patient would like to proceed with ongoing cancer treatment and follow-up with his evaluate oncology as well as radiation oncology physicians to determine further plan. Patient reported he is not ready for hospice at this time. Extensive conversations had with son Ni Galvan via telephone. Patient improved symptomatically and was evaluated for home oxygen. Patient qualifies for oxygen-2 L with rest and exertion.   Patient will be set up for home health therapy and a rolling walker was provided. In conclusion, patient is stable for discharge at this time. Instructed to follow-up with specialist as above. Condition at Discharge: stable     Discharge Day Visit / Exam:     Subjective: Resting in bed. Remains with 2 L oxygen nasal cannula. This has been set up for him to go home on oxygen. Jessica Gibsonodore to go home today. Natalia Fiddler info provided by  to help coordinate transport to outpt appointments. Discussed with son via telephone. Vitals: Blood Pressure: 124/60 (08/20/23 0717)  Pulse: 58 (08/20/23 0717)  Temperature: (!) 97.1 °F (36.2 °C) (08/20/23 0717)  Temp Source: Temporal (08/20/23 0717)  Respirations: 18 (08/20/23 0717)  Height: 5' 9" (175.3 cm) (08/14/23 1807)  Weight - Scale: 85.2 kg (187 lb 12.8 oz) (08/14/23 1804)  SpO2: 98 % (08/20/23 0717)     Exam:   Physical Exam  Vitals and nursing note reviewed. Constitutional:       General: He is not in acute distress. Appearance: Normal appearance. He is normal weight. He is not toxic-appearing or diaphoretic. HENT:      Head: Normocephalic and atraumatic. Eyes:      General: No scleral icterus. Cardiovascular:      Rate and Rhythm: Normal rate and regular rhythm. Pulmonary:      Effort: Pulmonary effort is normal. No respiratory distress. Breath sounds: Normal breath sounds. No stridor. No wheezing or rhonchi. Comments: ON 2 L NC  Abdominal:      General: Bowel sounds are normal. There is no distension. Palpations: Abdomen is soft. There is no mass. Tenderness: There is no abdominal tenderness. Hernia: No hernia is present. Musculoskeletal:         General: No swelling. Cervical back: Neck supple. Skin:     General: Skin is warm and dry. Neurological:      Mental Status: He is alert and oriented to person, place, and time. Mental status is at baseline.    Psychiatric:         Mood and Affect: Mood normal.         Behavior: Behavior normal. Discussion with Family: son via telephone    Discharge instructions/Information to patient and family:   See after visit summary for information provided to patient and family. Provisions for Follow-Up Care:  See after visit summary for information related to follow-up care and any pertinent home health orders. Planned Readmission: no     Discharge Statement:  I spent 50 minutes discharging the patient. This time was spent on the day of discharge. I had direct contact with the patient on the day of discharge. Greater than 50% of the total time was spent examining patient, answering all patient questions, arranging and discussing plan of care with patient as well as directly providing post-discharge instructions. Additional time then spent on discharge activities. Discharge Medications:  See after visit summary for reconciled discharge medications provided to patient and family.       ** Please Note: This note has been constructed using a voice recognition system **

## 2023-08-20 NOTE — ASSESSMENT & PLAN NOTE
Requiring 2 L oxygen nasal cannula while here  Likely secondary to acute pneumonia and underlying lung disease  Evaluated by respiratory therapy and qualifies for home oxygen  Will be set up for oxygen 2 L at rest and with exertion Detail Level: Zone Initiate Treatment: Nitrofurantonin Render In Strict Bullet Format?: No

## 2023-08-22 LAB
DME PARACHUTE DELIVERY DATE ACTUAL: NORMAL
DME PARACHUTE DELIVERY DATE REQUESTED: NORMAL
DME PARACHUTE ITEM DESCRIPTION: NORMAL
DME PARACHUTE ORDER STATUS: NORMAL
DME PARACHUTE SUPPLIER NAME: NORMAL
DME PARACHUTE SUPPLIER PHONE: NORMAL

## 2023-08-22 NOTE — UTILIZATION REVIEW
NOTIFICATION OF ADMISSION DISCHARGE   This is a Notification of Discharge from [facility] TEQUILA Badillo. Please be advised that this patient has been discharge from our facility. Below you will find the admission and discharge date and time including the patient’s disposition. UTILIZATION REVIEW CONTACT:  Taco Graves  Utilization   Network Utilization Review Department  Phone: [phone] x carefully listen to the prompts. All voicemails are confidential.  Email: [email or web address]     ADMISSION INFORMATION  PRESENTATION DATE: 8/14/2023  7:08 AM    INPATIENT ADMISSION DATE: 8/14/23  3:45 PM   DISCHARGE DATE: 8/20/2023  5:35 PM   DISPOSITION:Home with Home Health Care    IMPORTANT INFORMATION:  Send all requests for admission clinical reviews, approved or denied determinations and any other requests to dedicated fax number below belonging to the campus where the patient is receiving treatment. List of dedicated fax numbers:  [address] [facility] (Administrative/Medical Necessity) [phone] [facility] [phone]   [address] [facility] [phone]   [facility] [phone] 1636 [facility] [phone]   [address] [place] [address] [phone]   [address] [place] [address] [address] [place] [address] [phone]   [address] [place] [address] [phone]   [address] [place] [address] [phone] 2720 [facility] [phone] [address] [phone]         Anny Roa PA-C  Physician Assistant  Hospitalist  Discharge Summary      Attested  Date of Service:  8/20/2023 10:21 AM     Attested            Attestation signed by Sylvester Weber MD at 8/20/2023  7:23 PM      I was the supervising/collaborating physician on 8/20.   I acknowledge the AP's documentation and services provided. I was available by phone, if needed, for consultation.  Carolyn Zelaya MD 08/20/23              [address] [facility]  [facility]- [facility] 1955, 77 y.o. male MRN: [id number]  Unit/Bed#: E4 -01 Encounter: [id number]  Primary Care Provider: No primary care provider on file.    Date and time admitted to hospital: 8/14/2023  7:08 AM     * Pneumonia  Assessment & Plan  Possible pneumonia given infiltrate seen on imaging  Drip score of 4, patient is immunocompromised  Legionella and strep pneumo antigen negative  Procalcitonin elevated although may be unreliable in the setting of malignancy  Has had previous bronchoscopy in the past without any definitive organism  Also required oxygen which patient does not normally use  S/p IV ceftriaxone and azithromycin x 3 days  Sputum culture - growing Pseudomonas  Transitioned to oral Levaquin to complete a 7-day antibiotic course day 6/7  Remains on 2 L NC and qualifies for home oxygen 2 L with rest and exertion  PT/OT initially recommending short-term rehab but now recommending home health therapy  Patient also provided with rolling walker  [facility]  CM providing [employer] application to assist with outpatient transportation to appointments     Malignant neoplasm metastatic to brain [facility])  Assessment & Plan  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   Follows with [facility] oncology  Patient was contemplating hospice/palliative care but not ready yet  Administered G-CSF x1 per oncology  Oncology coordinated with the patients oncologist at [facility] oncology while the patient was hospitalized   Patient will ultimately require outpatient follow-up with his [facility] providers to discuss further options - seen by palliative here in the mean time - goal to proceed with current therapy at this time   Will need outpatient referral to [facility] palliative care for coordination of care     DEMARCO (dyspnea on exertion)  Assessment & Plan  History of ongoing dyspnea on exertion, previously evaluated at [facility] 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 He remains on chronic prednisone, CT chest ordered for further characterization of right lower lobe infiltrate. Low clinical suspicion for pulmonary embolism given recent negative PE study from 8/11/23, patient is fully anticoagulated with Eliquis and with reported compliance. Has had multiple admissions for dyspnea on exertion with negative PE and ACS work-ups. Pulmonary fibrosis likely contributing factor as well. [facility] Oncology recommended increasing chronic prednisone to 60 mg daily along with pulmonary consult. Treated for pneumonia with antibiotics.     Acute respiratory failure with hypoxia (HCC)  Assessment & Plan  Requiring 2 L oxygen nasal cannula while here  Likely secondary to acute pneumonia and underlying lung disease  Evaluated by respiratory therapy and qualifies for home oxygen  Will be set up for oxygen 2 L at rest and with exertion     Right groin pain  Assessment & Plan  Patient complained of right-sided groin pain during his stay  Upon evaluation, nontender to touch. CT abdomen and pelvis performed -appendix at upper limits of normal in caliber however no surrounding inflammatory change, no evidence of bowel obstruction, no hernias in inguinal canals, no evidence of diverticulitis.   Discussed findings with patient  Denies further groin pain at this time - resolved over the span of 24 hrs  If pain returns or worsens, would evaluate right hip     Anemia  Assessment & Plan  Likely related to patient's chemotherapy, does have pancytopenia  Hgb trend 7.9 -- 7.3 -- 8.0 -- 7.6  Patient did not require a transfusion here     Neutropenia ([address])  Assessment & Plan  Continue neutropenic precautions     DVT (deep venous thrombosis) ([address])  Assessment & Plan  Compliant with anticoagulation-continue Eliquis     CKD (chronic kidney disease) stage 3, GFR 30-59 ml/min [facility])  Assessment & Plan        Lab Results   Component Value Date     EGFR 55 08/19/2023     EGFR 57 08/18/2023     EGFR 57 08/16/2023     CREATININE 1.30 08/19/2023     CREATININE 1.25 08/18/2023     CREATININE 1.25 08/16/2023   Renal function around historical baseline     BPH (benign prostatic hyperplasia)  Assessment & Plan  Continue Proscar        Consultations During Hospital Stay:  • Pulmonary   • Oncology Hematology  • Palliative care     Procedures Performed:   • CT abdomen pelvis wo contrast  • Result Date: 8/19/2023  • Impression: Mild thickening of the distal esophagus Small pleural effusions. No evidence of small bowel obstruction. Hyperdense cyst or nodule appears to be evident. Nonemergent follow-up ultrasound may be helpful although we are attempting to obtain prior studies to compare. The appendix is at the upper limits of normal in caliber but without surrounding inflammatory change. Clinical correlation with any right lower quadrant tenderness is advised. If symptoms should persist a follow-up study may be useful. This was discussed with Dr. Lawyer Buerger at 12 noon. Previous studies have been requested from [facility] Workstation performed: [id number]      • CT chest wo contrast  • Result Date: 8/14/2023  • Impression: No definite acute disease. Small cluster of tree-in-bud nodules in the lateral right upper lobe, likely minimal bronchiolitis. Mild lower lobe basilar predominant reticulation and traction bronchiolectasis suggestive of mild fibrosis. No visible esophageal malignancy. Workstation performed: [id number]      • CT soft tissue neck  • Result Date: 8/14/2023  • Impression: No nodular enhancing lesions identified along the course of the aerodigestive tract. No discrete edema or collection identified.  Correlate with outside prior exams for findings related to the patient's known clinical history of distal esophageal neoplasm. Workstation performed: [id number]      • XR chest 1 view portable  • Result Date: 8/14/2023  • Impression: Faint groundglass opacity at right lung base suspicious for early infiltrate versus atelectasis Workstation performed: [id number]      Significant Findings / Test Results:   • Pneumonia  • Acute respiratory failure with hypoxia  • Chronic anemia     Incidental Findings:   • None     Test Results Pending at Discharge (will require follow up): • None     Outpatient follow-up requested:  • [facility] Oncology  • [facility] Radiation Oncology     Complications:  none     Hospital Course:      Luisana Santizo is a 77 y.o. male patient who originally presented to the hospital on 8/14/2023 due to shortness of breath x1 week. Patient receives all of his care through [facility] [place] however was brought to [facility] given LVH was on divert. He was found to have possible infiltrate on imaging and started on IV antibiotics with ceftriaxone and azithromycin. His procalcitonin was elevated although may be unreliable in the setting of malignancy. His sputum culture ended up growing Pseudomonas and antibiotics were transitioned to oral Levaquin for additional coverage to complete a 7 day course. He was seen in consultation by oncology here who conferred with patient's oncologist at [facility] [place]  Recommendations included increasing patient's chronic prednisone up to 60 mg daily given findings of fibrosis on recent CT imaging. They also recommended pulmonary consult and he was here by our pulmonary team.  During the patient's stay, goals of care was addressed as well as palliative care consultation. Currently patient would like to proceed with ongoing cancer treatment and follow-up with his evaluate oncology as well as radiation oncology physicians to determine further plan. Patient reported he is not ready for hospice at this time.   Extensive conversations had with son Palma Cogan via telephone. Patient improved symptomatically and was evaluated for home oxygen. Patient qualifies for oxygen-2 L with rest and exertion. Patient will be set up for home health therapy and a rolling walker was provided. In conclusion, patient is stable for discharge at this time. Instructed to follow-up with specialist as above.     Condition at Discharge: stable      Discharge Day Visit / Exam:      Subjective: Resting in bed. Remains with 2 L oxygen nasal cannula. This has been set up for him to go home on oxygen. Rishabh Palafox to go home today. Mallory Solorio info provided by  to help coordinate transport to outpt appointments. Discussed with son via telephone.      Vitals: Blood Pressure: 124/60 (08/20/23 0717)  Pulse: 58 (08/20/23 0717)  Temperature: (!) 97.1 °F (36.2 °C) (08/20/23 0717)  Temp Source: Temporal (08/20/23 0717)  Respirations: 18 (08/20/23 0717)  Height: 5' 9" (175.3 cm) (08/14/23 1807)  Weight - Scale: 85.2 kg (187 lb 12.8 oz) (08/14/23 1804)  SpO2: 98 % (08/20/23 0717)      Exam:   Physical Exam  Vitals and nursing note reviewed. Constitutional:       General: He is not in acute distress. Appearance: Normal appearance. He is normal weight. He is not toxic-appearing or diaphoretic. HENT:      Head: Normocephalic and atraumatic. Eyes:      General: No scleral icterus. Cardiovascular:      Rate and Rhythm: Normal rate and regular rhythm. Pulmonary:      Effort: Pulmonary effort is normal. No respiratory distress. Breath sounds: Normal breath sounds. No stridor. No wheezing or rhonchi. Comments: ON 2 L NC  Abdominal:      General: Bowel sounds are normal. There is no distension. Palpations: Abdomen is soft. There is no mass. Tenderness: There is no abdominal tenderness. Hernia: No hernia is present. Musculoskeletal:         General: No swelling. Cervical back: Neck supple. Skin:     General: Skin is warm and dry.    Neurological:      Mental Status: He is alert and oriented to person, place, and time. Mental status is at baseline. Psychiatric:         Mood and Affect: Mood normal.         Behavior: Behavior normal.         Discussion with Family: son via telephone     Discharge instructions/Information to patient and family:   See after visit summary for information provided to patient and family.       Provisions for Follow-Up Care:  See after visit summary for information related to follow-up care and any pertinent home health orders.       Planned Readmission: no     Discharge Statement:  I spent 50 minutes discharging the patient. This time was spent on the day of discharge. I had direct contact with the patient on the day of discharge. Greater than 50% of the total time was spent examining patient, answering all patient questions, arranging and discussing plan of care with patient as well as directly providing post-discharge instructions.   Additional time then spent on discharge activities.     Discharge Medications:  See after visit summary for reconciled discharge medications provided to patient and family.       ** Please Note: This note has been constructed using a voice recognition system **                             Cosigned by: Angy Cuevas MD at 8/20/2023  7:23 PM     Revision History

## 2023-08-29 NOTE — UTILIZATION REVIEW
NOTIFICATION OF ADMISSION DISCHARGE   This is a Notification of Discharge from [address] [place] [address]. Please be advised that this patient has been discharge from our facility. Below you will find the admission and discharge date and time including the patient’s disposition. UTILIZATION REVIEW CONTACT:  Harshad Aguirre  Utilization   Network Utilization Review Department  Phone: [phone] x carefully listen to the prompts. All voicemails are confidential.  Email: [email or web address]     ADMISSION INFORMATION  PRESENTATION DATE: 8/14/2023  7:08 AM  OBERVATION ADMISSION DATE:   INPATIENT ADMISSION DATE: 8/14/23  3:45 PM   DISCHARGE DATE: 8/20/2023  5:35 PM   DISPOSITION:Home with Home Health Care    IMPORTANT INFORMATION:  Send all requests for admission clinical reviews, approved or denied determinations and any other requests to dedicated fax number below belonging to the campus where the patient is receiving treatment.  List of dedicated fax numbers:  [place] DENIALS (Administrative/Medical Necessity) [phone] 2303 [facility] (Maternity/NICU/Pediatrics) [phone]   [facility] [phone]   [facility] [phone] 1636 [facility] [phone]   [address] [place] [address] [phone]   [facility] [phone]   [address] [place] [address] 608 [facility] [phone]   506 [facility] [phone]   344 [facility] [phone]   [address] [place] [address] [facility] [phone]